# Patient Record
Sex: MALE | Race: WHITE | NOT HISPANIC OR LATINO | Employment: FULL TIME | URBAN - METROPOLITAN AREA
[De-identification: names, ages, dates, MRNs, and addresses within clinical notes are randomized per-mention and may not be internally consistent; named-entity substitution may affect disease eponyms.]

---

## 2019-08-28 ENCOUNTER — TRANSFERRED RECORDS (OUTPATIENT)
Dept: HEALTH INFORMATION MANAGEMENT | Facility: CLINIC | Age: 49
End: 2019-08-28

## 2019-09-11 RX ORDER — CITALOPRAM HYDROBROMIDE 20 MG/1
20 TABLET ORAL DAILY
COMMUNITY

## 2019-09-11 RX ORDER — LOSARTAN POTASSIUM 50 MG/1
50 TABLET ORAL DAILY
COMMUNITY
End: 2020-01-09

## 2019-09-11 RX ORDER — METOPROLOL SUCCINATE 100 MG/1
100 TABLET, EXTENDED RELEASE ORAL DAILY
COMMUNITY
End: 2022-03-04 | Stop reason: ALTCHOICE

## 2019-09-13 ENCOUNTER — OFFICE VISIT (OUTPATIENT)
Dept: OTOLARYNGOLOGY | Facility: OTHER | Age: 49
End: 2019-09-13
Attending: OTOLARYNGOLOGY
Payer: COMMERCIAL

## 2019-09-13 VITALS
WEIGHT: 236 LBS | SYSTOLIC BLOOD PRESSURE: 124 MMHG | TEMPERATURE: 97.1 F | BODY MASS INDEX: 31.28 KG/M2 | OXYGEN SATURATION: 98 % | HEART RATE: 60 BPM | HEIGHT: 73 IN | DIASTOLIC BLOOD PRESSURE: 84 MMHG

## 2019-09-13 DIAGNOSIS — R43.9 SMELL AND TASTE DISORDER: ICD-10-CM

## 2019-09-13 DIAGNOSIS — J34.3 NASAL TURBINATE HYPERTROPHY: ICD-10-CM

## 2019-09-13 DIAGNOSIS — J32.4 CHRONIC PANSINUSITIS: Primary | ICD-10-CM

## 2019-09-13 DIAGNOSIS — J33.9 NASAL POLYPOSIS: ICD-10-CM

## 2019-09-13 DIAGNOSIS — Z79.01 CHRONIC ANTICOAGULATION: ICD-10-CM

## 2019-09-13 DIAGNOSIS — Z71.6 TOBACCO ABUSE COUNSELING: ICD-10-CM

## 2019-09-13 DIAGNOSIS — J34.2 DNS (DEVIATED NASAL SEPTUM): ICD-10-CM

## 2019-09-13 PROCEDURE — 99244 OFF/OP CNSLTJ NEW/EST MOD 40: CPT | Mod: 25 | Performed by: OTOLARYNGOLOGY

## 2019-09-13 PROCEDURE — 31231 NASAL ENDOSCOPY DX: CPT | Performed by: OTOLARYNGOLOGY

## 2019-09-13 RX ORDER — PANTOPRAZOLE SODIUM 40 MG/1
40 TABLET, DELAYED RELEASE ORAL DAILY
COMMUNITY

## 2019-09-13 RX ORDER — FLUTICASONE PROPIONATE 50 MCG
2 SPRAY, SUSPENSION (ML) NASAL DAILY
Qty: 16 G | Refills: 12 | Status: SHIPPED | OUTPATIENT
Start: 2019-09-13 | End: 2019-10-24

## 2019-09-13 RX ORDER — CEFDINIR 300 MG/1
300 CAPSULE ORAL 2 TIMES DAILY
Qty: 28 CAPSULE | Refills: 0 | Status: SHIPPED | OUTPATIENT
Start: 2019-09-13 | End: 2019-10-24

## 2019-09-13 RX ORDER — BUDESONIDE 0.5 MG/2ML
INHALANT ORAL
Qty: 3 BOX | Refills: 11 | Status: SHIPPED | OUTPATIENT
Start: 2019-09-13 | End: 2019-12-10

## 2019-09-13 RX ORDER — PREDNISONE 20 MG/1
TABLET ORAL
Qty: 8 TABLET | Refills: 0 | Status: SHIPPED | OUTPATIENT
Start: 2019-09-13 | End: 2019-10-24

## 2019-09-13 ASSESSMENT — MIFFLIN-ST. JEOR: SCORE: 1989.37

## 2019-09-13 ASSESSMENT — PAIN SCALES - GENERAL: PAINLEVEL: MILD PAIN (2)

## 2019-09-13 NOTE — NURSING NOTE
"Chief Complaint   Patient presents with     Referral     E Magalieva Referral   Chronic Sinusitis       Initial /84   Pulse 60   Temp 97.1  F (36.2  C) (Tympanic)   Ht 1.854 m (6' 1\")   Wt 107 kg (236 lb)   SpO2 98%   BMI 31.14 kg/m   Estimated body mass index is 31.14 kg/m  as calculated from the following:    Height as of this encounter: 1.854 m (6' 1\").    Weight as of this encounter: 107 kg (236 lb).  Medication Reconciliation: complete  "

## 2019-09-13 NOTE — PATIENT INSTRUCTIONS
Thank you for allowing Dr. Fraser and our ENT team to participate in your care.  If your medications are too expensive, please give the nurse a call.  We can possibly change this medication.  If you have a scheduling or an appointment question please contact our Health Unit Coordinator at their direct line [phone].   ALL nursing questions or concerns can be directed to your ENT nurse at: [phone] - [place]    Use Budesonide Rinses Twice Daily  Start Flonase 2 sprays, twice daily for 1 month, then once daily  Complete Omnicef  Complete Prednisone Taper  Repeat Sinus CT  Quit Smoking  Follow up with Dr. Fraser in 1 month      Budesonide nasal saline irrigation per instructions:  -Obtain Roberto Med Sinus rinse over the counter.    -Use warm distilled water and 2 packets of the salt solution that comes with the bottle, dissolve in bottle up to the 240 mL cori.  -Add 1 vial of budesonide.  -Irrigate each side of your nose leaning over the sink, using 1/3 to 1/2 the volume of the bottle in each nostril every irrigation.  Irrigate 2 times daily.  -If additional rinses are needed/recommended, you may use the plan Roberto Med Sinus irrigation without the use of added budesonide.

## 2019-09-13 NOTE — LETTER
2019         RE: Franco Alvarez  [address]  [facility] [address]        Dear Colleague,    Thank you for referring your patient, Franco Alvarez, to the [facility]. Please see a copy of my visit note below.    Otolaryngology Consultation    Patient: Franco Alvarez  : 1970    Patient presents with:  Referral: MOE Echeverria Referral   Chronic Sinusitis      HPI:  Franco Alvarez is a 49 year old male seen today for chronic congestion and anosmia  He is congested most days for years but recent malodorous and distasteful .pnd    History of occasional sinusitis treated with abx  Hx of perennial allergic rhinitis improved sense childhood  No prior SCIT or Intradermal testing   Frequent epistaxis and dry nose  Anosmia and taste disorder x 1 month  He was placed on flonase thinking   He cannot smell fuel at work  Distasteful post nasal drainage and bad taste in mouth    Decreased sense of taste    Concussion in   Hx of migraines which are worsening over the past 3 months along with progressive sinus symptoms    Working smoke detectors    Hx of migraines with aura and nausea with usually left sided, taking relpax  No known NSAID intolerance  Her aspirin intolerance    He does have a history of DVT due to stated history of factor V Leyden deficiency and is on Xarelto  No wheezing or shortness of breath  He does have a chart history of asthma but states he has not had any adult problems with asthma    Current Outpatient Rx   Medication Sig Dispense Refill     Albuterol (VENTOLIN IN)        citalopram (CELEXA) 20 MG tablet Take 20 mg by mouth daily       losartan (COZAAR) 50 MG tablet Take 50 mg by mouth daily       metoprolol succinate ER (TOPROL-XL) 100 MG 24 hr tablet Take 100 mg by mouth daily       pantoprazole (PROTONIX) 40 MG EC tablet Take 40 mg by mouth daily       rivaroxaban ANTICOAGULANT (XARELTO) 20 MG TABS tablet Take 20 mg by mouth daily (with dinner)         Allergies:  "Seasonal allergies     Past Medical History:   Diagnosis Date     Anxiety disorder      Asthma      Migraine headache        History reviewed. No pertinent surgical history.    ENT family history reviewed    Social History     Tobacco Use     Smoking status: Light Tobacco Smoker     Smokeless tobacco: Never Used     Tobacco comment: 3-4 cigs daily   Substance Use Topics     Alcohol use: Yes     Drug use: None       Review of Systems  ROS: 10 point ROS neg other than the symptoms noted above in the HPI and heartburn, swollen feet, occasional sneezing hearing loss ear drainage ear congestion night sweats tingling numbness.      Physical Exam  /84   Pulse 60   Temp 97.1  F (36.2  C) (Tympanic)   Ht 1.854 m (6' 1\")   Wt 107 kg (236 lb)   SpO2 98%   BMI 31.14 kg/m     General - The patient is well nourished and well developed, and appears to have good nutritional status.  Alert and oriented to person and place, answers questions and cooperates with examination appropriately.   Head and Face - Normocephalic and atraumatic, with no gross asymmetry noted.  The facial nerve is intact, with strong symmetric movements.  Voice and Breathing - The patient was breathing comfortably without the use of accessory muscles. There was no wheezing, stridor, or stertor.  The patients voice was clear and strong, and had appropriate pitch and quality.  No leo peripheral digital clubbing or cyanosis   Ears -The external auditory canals are patent, the tympanic membranes are intact without effusion, retraction or mass.  Bony landmarks are intact.  Eyes - Extraocular movements intact, and the pupils were reactive to light.  Sclera were not icteric or injected, conjunctiva were pink and moist.  Mouth - Examination of the oral cavity showed pink, healthy oral mucosa. No lesions or ulcerations noted.  The tongue was mobile and midline, and the dentition were in good condition.    Throat - The walls of the oropharynx were smooth, " pink, moist, symmetric, and had no lesions or ulcerations.  The tonsillar pillars and soft palate were symmetric.  The uvula was midline on elevation.    Neck - No palpable enlarged fixed cervical lymph nodes.  No neck cysts or unusual tenderness to palpation.   No palpable fixed thyroid nodules or concerning goiter.  The trachea is grossly midline.   Nose - External contour is symmetric, no gross deflection or scars.  Nasal mucosa is pink and moist with no abnormal mucus.  The septum and turbinates were evaluated: DNS left with contact 80% obstruction, severe ITH.  No polyps, masses, or purulence noted on examination.    To evaluate the nose and sinuses in the post operative state, I performed rigid nasal endoscopy. The LPN had previously sprayed both nares with lidocaine and neosynephrine.    I began with the LEFT side using a 0 degree rigid nasal endoscope, and then similarly examined the RIGHT side    Findings:  Inferior turbinates:  Boggy, enlarged  Middle turbinate and middle meatus:  Purulence throughout with polyps involving the middle meatus, purulence was scant and suction debrided, Edematous mucosa throughotu  Too edematous to viz NP    Imaging  CT sinuses has not been noted a pack a time to look at this for times daily CT 8/28/2019 mucosal thickening was noted of the frontal ethmoid sphenoid and maxillary sinuses and air-fluid levels noted in the left maxillary sinuse the ostium middle complexes are obstructed septal deviation to the left turbinate hypertrophy is noted    Impression and Plan- Franco Alvarez is a 49 year old male with:    ICD-10-CM    1. Chronic pansinusitis J32.4 budesonide (PULMICORT) 0.5 MG/2ML neb solution     cefdinir (OMNICEF) 300 MG capsule     fluticasone (FLONASE) 50 MCG/ACT nasal spray     predniSONE (DELTASONE) 20 MG tablet     CT Sinus w/o Contrast   2. Nasal polyposis J33.9 CT Sinus w/o Contrast   3. DNS (deviated nasal septum) J34.2    4. Nasal turbinate hypertrophy J34.3     5. 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 Chronic anticoagulation Z79.01    7. Tobacco abuse counseling Z71.6        I would like to see how is doing after treatments and repeat the CAT scan in 2 to 4 weeks if no improvement consider  Endoscopic Sinus Surgery (ESS) with polypectomy,  septoplasty, turbinate reduction    This was all discussed today    Use Budesonide Rinses Twice Daily  Start Flonase 2 sprays, twice daily for 1 month, then once daily  Complete Omnicef  Complete Prednisone Taper  Repeat Sinus CT  Quit Smoking  Follow up with Dr. Fraser in 1 month      Budesonide nasal saline irrigation per instructions:  -Obtain Roberto Med Sinus rinse over the counter.    -Use warm distilled water and 2 packets of the salt solution that comes with the bottle, dissolve in bottle up to the 240 mL cori.  -Add 1 vial of budesonide.  -Irrigate each side of your nose leaning over the sink, using 1/3 to 1/2 the volume of the bottle in each nostril every irrigation.  Irrigate 2 times daily.  -If additional rinses are needed/recommended, you may use the plan Roberto Med Sinus irrigation without the use of added budesonide.       Risks of oral steroid use were discussed and include psychiatric/mood changes, insomnia, stomach ulcers and potential GI bleeding, blood sugar elevation/worsening diabetes, hip/bone necrosis or bone demineralization.    He did have a distant stomach ulcer and he is on Xarelto so I told him he is at high risk of complications from steroid use.  However he is very bothered by his taste and smell disorder    Tobacco cessation was strongly encouraged.  The associated risk of head and neck cancer was discussed.  Every year of cessation offers health benefits. This was discussed with the patient today and they voiced understanding.  Quit tools and a nicotine patch were offered.      The most common cause of loss of sense of smell (anosmia) is a viral infection, followed by trauma (particularly in younger  patients)     Antihyperlipidemics (lipitor or similar) can cause persistent taste and smell dysfunction even after years of use.  I recommend stopping antihyperlipidemics per primary care, unless there are undue risks of stopping medical treatment.      I will order a CT sinuses if this has not already been done to ensure there are no polyps within your sinuses.  No nasal polyps were seen on exam today, nor sign of a sinus infection.  It is OK to postpone CT sinuses as I see no obvious concern on endoscopy today.    The remainder of today's visit was spent discussing anosmia.  I stressed that according to my experience and the clinical research, the underlying cause of anosmia is actually rarely found.  I have explained the indications for a brain MRI, and the cost versus benefit of MRI and the fact that MRI is a very low yield test in finding a central problem causing anosmia or taste disorder.    Zinc deficiency and thyroid disease my lead to taste and smell disorder.  Thyroid labs and zinc levels were drawn.      Dental follow up may be recommended to rule out dental disease, which was not obviously present today. Any history of trauma to the oral cavity or head involving metal/gunshot may cause lead leaching.  A lead level may be checked.    Next we discussed important precautions to take when one has an altered sense of smell.  For example, I recommended use of natural gas and carbon monoxide detection in the home, and being very careful with expiration dates on foods.    Finally, alternative techniques to make food more interesting were also discussed.  These included spiciness, visual appeal, and more interesting textures.    There were no local mouth or nose abnormalities found on today's exam.      Follow up as needed for any additional ENT concerns.                    Marya Fraser D.O.  Otolaryngology/Head and Neck Surgery  Allergy      Again, thank you for allowing me to participate in the care  of your patient.        Sincerely,        Marya Fraser MD

## 2019-09-13 NOTE — PROGRESS NOTES
Otolaryngology Consultation    Patient: Franco Alvarez  : 1970    Patient presents with:  Referral: MOE Echeverria Referral   Chronic Sinusitis      HPI:  Franco Alvarez is a 49 year old male seen today for chronic congestion and anosmia  He is congested most days for years but recent malodorous and distasteful .pnd    History of occasional sinusitis treated with abx  Hx of perennial allergic rhinitis improved sense childhood  No prior SCIT or Intradermal testing   Frequent epistaxis and dry nose  Anosmia and taste disorder x 1 month  He was placed on flonase thinking   He cannot smell fuel at work  Distasteful post nasal drainage and bad taste in mouth    Decreased sense of taste    Concussion in   Hx of migraines which are worsening over the past 3 months along with progressive sinus symptoms    Working smoke detectors    Hx of migraines with aura and nausea with usually left sided, taking relpax  No known NSAID intolerance  Her aspirin intolerance    He does have a history of DVT due to stated history of factor V Leyden deficiency and is on Xarelto  No wheezing or shortness of breath  He does have a chart history of asthma but states he has not had any adult problems with asthma    Current Outpatient Rx   Medication Sig Dispense Refill     Albuterol (VENTOLIN IN)        citalopram (CELEXA) 20 MG tablet Take 20 mg by mouth daily       losartan (COZAAR) 50 MG tablet Take 50 mg by mouth daily       metoprolol succinate ER (TOPROL-XL) 100 MG 24 hr tablet Take 100 mg by mouth daily       pantoprazole (PROTONIX) 40 MG EC tablet Take 40 mg by mouth daily       rivaroxaban ANTICOAGULANT (XARELTO) 20 MG TABS tablet Take 20 mg by mouth daily (with dinner)         Allergies: Seasonal allergies     Past Medical History:   Diagnosis Date     Anxiety disorder      Asthma      Migraine headache        History reviewed. No pertinent surgical history.    ENT family history reviewed    Social History     Tobacco Use      "Smoking status: Light Tobacco Smoker     Smokeless tobacco: Never Used     Tobacco comment: 3-4 cigs daily   Substance Use Topics     Alcohol use: Yes     Drug use: None       Review of Systems  ROS: 10 point ROS neg other than the symptoms noted above in the HPI and heartburn, swollen feet, occasional sneezing hearing loss ear drainage ear congestion night sweats tingling numbness.      Physical Exam  /84   Pulse 60   Temp 97.1  F (36.2  C) (Tympanic)   Ht 1.854 m (6' 1\")   Wt 107 kg (236 lb)   SpO2 98%   BMI 31.14 kg/m    General - The patient is well nourished and well developed, and appears to have good nutritional status.  Alert and oriented to person and place, answers questions and cooperates with examination appropriately.   Head and Face - Normocephalic and atraumatic, with no gross asymmetry noted.  The facial nerve is intact, with strong symmetric movements.  Voice and Breathing - The patient was breathing comfortably without the use of accessory muscles. There was no wheezing, stridor, or stertor.  The patients voice was clear and strong, and had appropriate pitch and quality.  No leo peripheral digital clubbing or cyanosis   Ears -The external auditory canals are patent, the tympanic membranes are intact without effusion, retraction or mass.  Bony landmarks are intact.  Eyes - Extraocular movements intact, and the pupils were reactive to light.  Sclera were not icteric or injected, conjunctiva were pink and moist.  Mouth - Examination of the oral cavity showed pink, healthy oral mucosa. No lesions or ulcerations noted.  The tongue was mobile and midline, and the dentition were in good condition.    Throat - The walls of the oropharynx were smooth, pink, moist, symmetric, and had no lesions or ulcerations.  The tonsillar pillars and soft palate were symmetric.  The uvula was midline on elevation.    Neck - No palpable enlarged fixed cervical lymph nodes.  No neck cysts or unusual tenderness " to palpation.   No palpable fixed thyroid nodules or concerning goiter.  The trachea is grossly midline.   Nose - External contour is symmetric, no gross deflection or scars.  Nasal mucosa is pink and moist with no abnormal mucus.  The septum and turbinates were evaluated: DNS left with contact 80% obstruction, severe ITH.  No polyps, masses, or purulence noted on examination.    To evaluate the nose and sinuses in the post operative state, I performed rigid nasal endoscopy. The LPN had previously sprayed both nares with lidocaine and neosynephrine.    I began with the LEFT side using a 0 degree rigid nasal endoscope, and then similarly examined the RIGHT side    Findings:  Inferior turbinates:  Boggy, enlarged  Middle turbinate and middle meatus:  Purulence throughout with polyps involving the middle meatus, purulence was scant and suction debrided, Edematous mucosa throughotu  Too edematous to viz NP    Imaging  CT sinuses has not been noted a pack a time to look at this for times daily CT 8/28/2019 mucosal thickening was noted of the frontal ethmoid sphenoid and maxillary sinuses and air-fluid levels noted in the left maxillary sinuse the ostium middle complexes are obstructed septal deviation to the left turbinate hypertrophy is noted    Impression and Plan- Franco Alvarez is a 49 year old male with:    ICD-10-CM    1. Chronic pansinusitis J32.4 budesonide (PULMICORT) 0.5 MG/2ML neb solution     cefdinir (OMNICEF) 300 MG capsule     fluticasone (FLONASE) 50 MCG/ACT nasal spray     predniSONE (DELTASONE) 20 MG tablet     CT Sinus w/o Contrast   2. Nasal polyposis J33.9 CT Sinus w/o Contrast   3. DNS (deviated nasal septum) J34.2    4. Nasal turbinate hypertrophy J34.3    5. 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 Chronic anticoagulation Z79.01    7. Tobacco abuse counseling Z71.6        I would like to see how is doing after treatments and repeat the CAT scan in 2 to 4 weeks if no improvement consider  Endoscopic  Sinus Surgery (ESS) with polypectomy,  septoplasty, turbinate reduction    This was all discussed today    Use Budesonide Rinses Twice Daily  Start Flonase 2 sprays, twice daily for 1 month, then once daily  Complete Omnicef  Complete Prednisone Taper  Repeat Sinus CT  Quit Smoking  Follow up with Dr. Fraser in 1 month      Budesonide nasal saline irrigation per instructions:  -Obtain Roberto Med Sinus rinse over the counter.    -Use warm distilled water and 2 packets of the salt solution that comes with the bottle, dissolve in bottle up to the 240 mL cori.  -Add 1 vial of budesonide.  -Irrigate each side of your nose leaning over the sink, using 1/3 to 1/2 the volume of the bottle in each nostril every irrigation.  Irrigate 2 times daily.  -If additional rinses are needed/recommended, you may use the plan Roberto Med Sinus irrigation without the use of added budesonide.       Risks of oral steroid use were discussed and include psychiatric/mood changes, insomnia, stomach ulcers and potential GI bleeding, blood sugar elevation/worsening diabetes, hip/bone necrosis or bone demineralization.    He did have a distant stomach ulcer and he is on Xarelto so I told him he is at high risk of complications from steroid use.  However he is very bothered by his taste and smell disorder    Tobacco cessation was strongly encouraged.  The associated risk of head and neck cancer was discussed.  Every year of cessation offers health benefits. This was discussed with the patient today and they voiced understanding.  Quit tools and a nicotine patch were offered.      The most common cause of loss of sense of smell (anosmia) is a viral infection, followed by trauma (particularly in younger patients)     Antihyperlipidemics (lipitor or similar) can cause persistent taste and smell dysfunction even after years of use.  I recommend stopping antihyperlipidemics per primary care, unless there are undue risks of stopping medical treatment.       I will order a CT sinuses if this has not already been done to ensure there are no polyps within your sinuses.  No nasal polyps were seen on exam today, nor sign of a sinus infection.  It is OK to postpone CT sinuses as I see no obvious concern on endoscopy today.    The remainder of today's visit was spent discussing anosmia.  I stressed that according to my experience and the clinical research, the underlying cause of anosmia is actually rarely found.  I have explained the indications for a brain MRI, and the cost versus benefit of MRI and the fact that MRI is a very low yield test in finding a central problem causing anosmia or taste disorder.    Zinc deficiency and thyroid disease my lead to taste and smell disorder.  Thyroid labs and zinc levels were drawn.      Dental follow up may be recommended to rule out dental disease, which was not obviously present today. Any history of trauma to the oral cavity or head involving metal/gunshot may cause lead leaching.  A lead level may be checked.    Next we discussed important precautions to take when one has an altered sense of smell.  For example, I recommended use of natural gas and carbon monoxide detection in the home, and being very careful with expiration dates on foods.    Finally, alternative techniques to make food more interesting were also discussed.  These included spiciness, visual appeal, and more interesting textures.    There were no local mouth or nose abnormalities found on today's exam.      Follow up as needed for any additional ENT concerns.                    Marya Fraser D.O.  Otolaryngology/Head and Neck Surgery  Allergy

## 2019-09-23 ENCOUNTER — HOSPITAL ENCOUNTER (OUTPATIENT)
Dept: CT IMAGING | Facility: HOSPITAL | Age: 49
Discharge: HOME | End: 2019-09-23
Attending: OTOLARYNGOLOGY | Admitting: OTOLARYNGOLOGY
Payer: COMMERCIAL

## 2019-09-23 DIAGNOSIS — J32.4 CHRONIC PANSINUSITIS: ICD-10-CM

## 2019-09-23 DIAGNOSIS — J33.9 NASAL POLYPOSIS: ICD-10-CM

## 2019-09-23 PROCEDURE — 70486 CT MAXILLOFACIAL W/O DYE: CPT | Mod: TC

## 2019-10-24 ENCOUNTER — OFFICE VISIT (OUTPATIENT)
Dept: OTOLARYNGOLOGY | Facility: OTHER | Age: 49
End: 2019-10-24
Attending: OTOLARYNGOLOGY
Payer: COMMERCIAL

## 2019-10-24 VITALS
WEIGHT: 236 LBS | DIASTOLIC BLOOD PRESSURE: 74 MMHG | HEART RATE: 67 BPM | SYSTOLIC BLOOD PRESSURE: 128 MMHG | OXYGEN SATURATION: 98 % | HEIGHT: 73 IN | BODY MASS INDEX: 31.28 KG/M2 | TEMPERATURE: 98.1 F

## 2019-10-24 DIAGNOSIS — Z71.6 TOBACCO ABUSE COUNSELING: ICD-10-CM

## 2019-10-24 DIAGNOSIS — R43.9 SMELL AND TASTE DISORDER: ICD-10-CM

## 2019-10-24 DIAGNOSIS — J34.3 NASAL TURBINATE HYPERTROPHY: ICD-10-CM

## 2019-10-24 DIAGNOSIS — J34.2 DNS (DEVIATED NASAL SEPTUM): ICD-10-CM

## 2019-10-24 DIAGNOSIS — J32.4 CHRONIC PANSINUSITIS: Primary | ICD-10-CM

## 2019-10-24 DIAGNOSIS — J33.9 NASAL POLYP: ICD-10-CM

## 2019-10-24 PROCEDURE — 99215 OFFICE O/P EST HI 40 MIN: CPT | Mod: 25 | Performed by: OTOLARYNGOLOGY

## 2019-10-24 PROCEDURE — 31231 NASAL ENDOSCOPY DX: CPT | Performed by: OTOLARYNGOLOGY

## 2019-10-24 RX ORDER — PREDNISONE 20 MG/1
TABLET ORAL
Qty: 5 TABLET | Refills: 1 | Status: SHIPPED | OUTPATIENT
Start: 2019-10-24 | End: 2020-01-09

## 2019-10-24 ASSESSMENT — PAIN SCALES - GENERAL: PAINLEVEL: NO PAIN (0)

## 2019-10-24 ASSESSMENT — MIFFLIN-ST. JEOR: SCORE: 1989.37

## 2019-10-24 NOTE — LETTER
"    10/24/2019         RE: Franco Alvarez  [address]  [facility] [address]        Dear Colleague,    Thank you for referring your patient, Franco Alvarez, to the [facility]. Please see a copy of my visit note below.    Otolaryngology Progress Note          Franco Alvarez is a 49 year old male    Back for evaluation of sinuses.  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 factor V Leyden deficiency and is on Xarelto started on budesonide irrigations and Omnicef and continue Flonase use he is here for follow-up I did note a septal deviation on exam and severe mucosal edema throughout I could not visualize his nasopharynx he is a smoker and a encouraged tobacco cessation if no improvement in symptoms is considering endoscopic sinus surgery with polypectomy septoplasty and turbinate reduction          Physical Exam  /74 (Cuff Size: Adult Large)   Pulse 67   Temp 98.1  F (36.7  C) (Tympanic)   Ht 1.854 m (6' 1\")   Wt 107 kg (236 lb)   SpO2 98%   BMI 31.14 kg/m     General - The patient is well nourished and well developed, and appears to have good nutritional status.  Alert and oriented to person and place, interactive.  Head and Face - Normocephalic and atraumatic, with no gross asymmetry noted of the contour of the facial features.  The facial nerve is intact, with strong symmetric movements.  Eyes - Extraocular movements intact.   Nose - Nasal mucosa is pink and moist with no abnormal mucus.  The septum was deviated left 80% " obstruction, spur  Mouth - Examination of the oral cavity shows pink, healthy, moist mucosa.  No lesions or ulceration noted.  The dentition are in good repair.  The tongue is mobile and midline.  Throat - The walls of the oropharynx were smooth, pink, moist, symmetric, and had no lesions or ulcerations.  The tonsillar pillars and soft palate were symmetric.  The uvula was midline on elevation.      To evaluate the nose and sinuses, I performed rigid nasal endoscopy. The LPN had previously sprayed both nares with lidocaine and neosynephrine.    I began with the LEFT side using a 0 degree rigid nasal endoscope, and then similarly examined the RIGHT side    Findings:  Inferior turbinates:  enlarged  Middle turbinate and middle meatus:  No purulence, MM polyposis,   Mucosa is less edematous throughout  Nasopharynx grossly clear R  The patient tolerated the procedure well      Imaging  CT scan dated 9/23/2019 revealed septal deviation to the left with mucoperiosteal thickening throughout      There is mucoperiosteal thickening and polypoid opacification of the ethmoid sinuses as well as the frontal and maxillary sinuses there is a septal deviation to the left knee compressive thickening of the sphenoid skull base is intact optic nerve is intact nasopharynx is grossly clear, keros 2  rosaura 19/24  snot 56         Impression/Plan  Franco Alvarez is a 49 year old male    ICD-10-CM    1. Chronic pansinusitis J32.4 predniSONE (DELTASONE) 20 MG tablet   2. Nasal polyp J33.9    3. DNS (deviated nasal septum) J34.2    4. Nasal turbinate hypertrophy J34.3    5. Tobacco abuse counseling Z71.6    6. Smell and taste disorder R43.9      No guarantees were given that his sense of smell and taste would improve that the malodorous taste and smell would improve.  Viral and trauma are the primary causes of anosmia and taste smell disorder and he has had a history of concussion as well as repeated sinusitis.  I do feel there is a good  chance he may have improvement based on where his disease is located however and this was discussed with Franco.    Tobacco cessation was strongly encouraged.  The associated risk of head and neck cancer was discussed.  Every year of cessation offers health benefits. This was discussed with the patient today and they voiced understanding.  Quit tools and a nicotine patch were offered.        The risks and complications of bilateral endoscopic sinus surgery septoplasty turbinate reduction were openly discussed.  The potential risks include bleeding, general anesthesia, infection, scar formation, need for additional surgery, septal perforation, and rarely injury to the eye with the possibility of blindness,  injury to the brain, meningitis or other intracranial complications.  Nonsurgical options were discussed including prolonged antibioitics and/or oral and topical steroids.   Sinus anatomy and sinus disease were reviewed.  CT sinus was reviewed with the patient.  All questions were answered.   Plan total with propel  Will need nasopore use due to Xarelto    Would like to hold Xarelto 3-5 days prior to surgery and 3-5 days post op  Total exam time was over 40 minutes with over 25 minutes of this time spent in direct patient education, counseling and coordination of care.  We discussed the importance of high flow nasal saline use to prevent nasal secretion stasis, the correct use of nasal steroids, and nasal and sinus anatomy were reviewed.        Marya Fraser D.O.  Otolaryngology/Head and Neck Surgery  Allergy        Again, thank you for allowing me to participate in the care of your patient.        Sincerely,        Marya Fraser MD

## 2019-10-24 NOTE — LETTER
Please excuse Franco from work for the morning of 10/24/19. He was at an appointment at our facility          Thank you,       Dr. Marya Fraser

## 2019-10-24 NOTE — NURSING NOTE
"Chief Complaint   Patient presents with     RECHECK     Follow Up Chronic Pansinusitis, Nasal Polyposis, Deviated Nasal Septum, Nasal Turbinate Hypertrophy       Initial /74 (Cuff Size: Adult Large)   Pulse 67   Temp 98.1  F (36.7  C) (Tympanic)   Ht 1.854 m (6' 1\")   Wt 107 kg (236 lb)   SpO2 98%   BMI 31.14 kg/m   Estimated body mass index is 31.14 kg/m  as calculated from the following:    Height as of this encounter: 1.854 m (6' 1\").    Weight as of this encounter: 107 kg (236 lb).  Medication Reconciliation: complete  Apple Santoro LPN    "

## 2019-10-24 NOTE — PROGRESS NOTES
"Otolaryngology Progress Note          Franco Alvarez is a 49 year old male    Back for evaluation of sinuses.  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 factor V Leyden deficiency and is on Xarelto started on budesonide irrigations and Omnicef and continue Flonase use he is here for follow-up I did note a septal deviation on exam and severe mucosal edema throughout I could not visualize his nasopharynx he is a smoker and a encouraged tobacco cessation if no improvement in symptoms is considering endoscopic sinus surgery with polypectomy septoplasty and turbinate reduction          Physical Exam  /74 (Cuff Size: Adult Large)   Pulse 67   Temp 98.1  F (36.7  C) (Tympanic)   Ht 1.854 m (6' 1\")   Wt 107 kg (236 lb)   SpO2 98%   BMI 31.14 kg/m    General - The patient is well nourished and well developed, and appears to have good nutritional status.  Alert and oriented to person and place, interactive.  Head and Face - Normocephalic and atraumatic, with no gross asymmetry noted of the contour of the facial features.  The facial nerve is intact, with strong symmetric movements.  Eyes - Extraocular movements intact.   Nose - Nasal mucosa is pink and moist with no abnormal mucus.  The septum was deviated left 80% obstruction, spur  Mouth - Examination of the oral cavity shows pink, healthy, moist mucosa.  No lesions or ulceration noted.  The dentition are in good repair.  The tongue is mobile and midline.  Throat - The walls of the oropharynx were smooth, " pink, moist, symmetric, and had no lesions or ulcerations.  The tonsillar pillars and soft palate were symmetric.  The uvula was midline on elevation.      To evaluate the nose and sinuses, I performed rigid nasal endoscopy. The LPN had previously sprayed both nares with lidocaine and neosynephrine.    I began with the LEFT side using a 0 degree rigid nasal endoscope, and then similarly examined the RIGHT side    Findings:  Inferior turbinates:  enlarged  Middle turbinate and middle meatus:  No purulence, MM polyposis,   Mucosa is less edematous throughout  Nasopharynx grossly clear R  The patient tolerated the procedure well      Imaging  CT scan dated 9/23/2019 revealed septal deviation to the left with mucoperiosteal thickening throughout      There is mucoperiosteal thickening and polypoid opacification of the ethmoid sinuses as well as the frontal and maxillary sinuses there is a septal deviation to the left knee compressive thickening of the sphenoid skull base is intact optic nerve is intact nasopharynx is grossly clear, keros 2  rosaura 19/24  snot 56         Impression/Plan  Franco Alvarez is a 49 year old male    ICD-10-CM    1. Chronic pansinusitis J32.4 predniSONE (DELTASONE) 20 MG tablet   2. Nasal polyp J33.9    3. DNS (deviated nasal septum) J34.2    4. Nasal turbinate hypertrophy J34.3    5. Tobacco abuse counseling Z71.6    6. Smell and taste disorder R43.9      No guarantees were given that his sense of smell and taste would improve that the malodorous taste and smell would improve.  Viral and trauma are the primary causes of anosmia and taste smell disorder and he has had a history of concussion as well as repeated sinusitis.  I do feel there is a good chance he may have improvement based on where his disease is located however and this was discussed with Franco.    Tobacco cessation was strongly encouraged.  The associated risk of head and neck cancer was discussed.  Every year of cessation offers  health benefits. This was discussed with the patient today and they voiced understanding.  Quit tools and a nicotine patch were offered.        The risks and complications of bilateral endoscopic sinus surgery septoplasty turbinate reduction were openly discussed.  The potential risks include bleeding, general anesthesia, infection, scar formation, need for additional surgery, septal perforation, and rarely injury to the eye with the possibility of blindness,  injury to the brain, meningitis or other intracranial complications.  Nonsurgical options were discussed including prolonged antibioitics and/or oral and topical steroids.   Sinus anatomy and sinus disease were reviewed.  CT sinus was reviewed with the patient.  All questions were answered.   Plan total with propel  Will need nasopore use due to Xarelto    Would like to hold Xarelto 3-5 days prior to surgery and 3-5 days post op  Total exam time was over 40 minutes with over 25 minutes of this time spent in direct patient education, counseling and coordination of care.  We discussed the importance of high flow nasal saline use to prevent nasal secretion stasis, the correct use of nasal steroids, and nasal and sinus anatomy were reviewed.        Marya Fraser D.O.  Otolaryngology/Head and Neck Surgery  Allergy

## 2019-10-24 NOTE — PATIENT INSTRUCTIONS
Thank you for allowing Dr. Frasre and our ENT team to participate in your care.  If your medications are too expensive, please give the nurse a call.  We can possibly change this medication.  If you have a scheduling or an appointment question please contact our Health Unit Coordinator at their direct line [phone].   ALL nursing questions or concerns can be directed to your ENT nurse at: [phone] Yaneth Chiu    Continue Budesonide Rinses   Continue Flonase  Follow up in surgery    Budesonide nasal saline irrigation per instructions:  -Obtain Roberto Med Sinus rinse over the counter.    -Use warm distilled water and 2 packets of the salt solution that comes with the bottle, dissolve in bottle up to the 240 mL cori.  -Add 1 vial of budesonide.  -Irrigate each side of your nose leaning over the sink, using 1/3 to 1/2 the volume of the bottle in each nostril every irrigation.  Irrigate 2 times daily.  -If additional rinses are needed/recommended, you may use the plan Roberto Med Sinus irrigation without the use of added budesonide.     Instructions for Sinus Surgery    Recovery - Everyone recovers differently from a general anesthetic.  Symptoms such as fatigue, nausea, light-headedness, and sometimes a low grade fever (up to 100 degrees) are not unusual.  As your body removes the anesthetic drugs from circulation, these symptoms will resolve.  Your nose will be sore after surgery, and you may even have symptoms similar to a sinus infection with headache, congestion, and pressure.  These will resolve with healing.  For several days you may experience bloody drainage from the nose, please use the drip pad as necessary for this.  If there is persistent bleeding, please call the office during business hours or the on call ENT physician after hours.  There are no diet restrictions after sinus surgery, and you can resume your home medications.      Please do not blow your nose until 1 week after surgery.  At 1 week you  may gently blow your nose, unless otherwise indicated by Dr. Fraser.     Limit your activity to no strenuous activities until I see you for the first follow-up visit in approximately 2 weeks.      Medications - You were sent home with narcotic pain medication.  If you can tolerate the discomfort during your recovery by using just plain Tylenol or ibuprofen (advil), please do so.  However, do not hesitate to use the stronger pain medication if needed.  If you were sent home with an antibiotic, it is primarily used to help the healing process.  If it causes loose bowel movements or other signs of intolerance, it is appropriate to discontinue it.  By far the most important measure you can take to speed recovery, and maximize the chances of long term success of sinus surgery is using the sinus rinses at least three time per day for the first month after surgery.       Start Chrissy Med saline irrigation tomorrow and use at least 5 times daily.     I recommend 2 chrissy med bottles, one to add the budesonide to and irrigate with the budesonide rinse twice a day for 2 months.    In the other chrissy med bottle use only the saline solution, and irrigate with this at least 3 additional times daily.    Perform gentle irrigation for the first week.  Starting 1 week after surgery, you should increase the volume of chrissy med saline irrigation to each nostril, continuing to use the rinses in an alternating fashion at least 5 times daily.  You cannot use too much of the chrissy med saline, but please limit budesonide rinses to twice daily.    At 2 weeks after surgery, you may also restart nasal steroids (flonase, nasonex, etc).        Complications - Problems related to sinus surgery almost always are detected during the operation, and special instruction will be given in that situation.  However, unexpected things can happen, and are all related to the structures around the sinus cavities.  Symptoms that should alert you to a possible  problem include: severe eye pain or eye swelling, persistent heavy bleeding from the nose, and high fevers with headache and neck pain.  Any of these symptoms should be called into my office or to the on call ENT if after hours.  The most common non-emergency complication of sinus surgery is the formation of scar tissue which can re-block the sinuses.  This is addressed below.    Follow-up -  As you have noted, there are quite a few follow-up visits after sinus surgery.  This is done to aggressively manage the most common complication of this technique, which is scar tissue blocking the sinuses.  These visits will require the examination of your nose and possibly removal of crusts of dry mucous and blood, with possible removal of early scar tissue.  Please prepare for these visits by using your sinus rinses.    If there are any questions or issues with the above, or if there are other issues that concern you, always feel free to call the clinic and I am happy to speak with you as soon as I can.    Marya Fraser D.O.  Otolaryngology/Head and Neck Surgery  Allergy    [phone] office            HOW TO PREPARE-      You need to have a scheduled Pre-Op with your primary care physician within 30 days of your scheduled surgery.        You need a friend or family member available to drive you home AND stay with you for 24 hours after you leave the hospital. You will not be allowed to drive yourself. IF you need to take a taxi or the bus you MUST have a responsible person to ride with you. YOUR PROCEDURE WILL BE CANCELLED IF YOU DO NOT HAVE A RESPONSIBLE ADULT TO DRIVE YOU HOME.       You CANNOT have anything to eat or drink after midnight the night before your surgery, including water and coffee. Your stomach needs to be completely empty. Do NOT chew gum, suck on hard candy, or smoke. You can brush your teeth the morning of surgery.       The Surgery Education Nurses will call you  1-2 weeks prior to your  surgery date at  [phone] or toll free [phone]. Please have your medication and allergy lists ready.      Stop your aspirin or other NSAIDs(Ibuprofen, Motrin, Aleve, Celebrex, Naproxen, etc...) 7 days before your surgery.      Hospital admitting will call you the day before your surgery with your exact arrival time.       Please call your primary care physician if you should become ill within 24 hours of scheduled surgery. (ex.vomiting, diarrhea, fever)          You will need to wash the night before AND the morning of you procedure with a liquid antibacterial soap, like Dial.

## 2019-11-20 ENCOUNTER — TRANSFERRED RECORDS (OUTPATIENT)
Dept: HEALTH INFORMATION MANAGEMENT | Facility: HOSPITAL | Age: 49
End: 2019-11-20

## 2019-11-20 LAB
CREAT SERPL-MCNC: 0.9 MG/DL (ref 0.7–1.4)
GLUCOSE SERPL-MCNC: 102 MG/DL (ref 64–112)
INR PPP: 18.2 (ref 7.26–12.96)
POTASSIUM SERPL-SCNC: 4.1 MMOL/L (ref 3.5–5.3)

## 2019-12-03 RX ORDER — FLUTICASONE PROPIONATE 50 MCG
2 SPRAY, SUSPENSION (ML) NASAL DAILY
COMMUNITY
End: 2020-06-03

## 2019-12-05 ENCOUNTER — ANESTHESIA EVENT (OUTPATIENT)
Dept: SURGERY | Facility: HOSPITAL | Age: 49
End: 2019-12-05
Payer: COMMERCIAL

## 2019-12-05 PROBLEM — G47.33 OBSTRUCTIVE SLEEP APNEA (ADULT) (PEDIATRIC): Status: ACTIVE | Noted: 2019-07-12

## 2019-12-05 ASSESSMENT — LIFESTYLE VARIABLES: TOBACCO_USE: 1

## 2019-12-05 NOTE — ANESTHESIA PREPROCEDURE EVALUATION
Anesthesia Pre-Procedure Evaluation    Patient: Franco Alvarez   MRN: [id number] : 1970          Preoperative Diagnosis: Chronic pansinusitis [J32.4]  Nose polyp [J33.9]  Deviated nasal septum [J34.2]  Hypertrophy of nasal turbinates [J34.3]  Tobacco abuse counseling [Z71.6]  Disturbances of sensation of smell and taste [R43.9]    Procedure(s):  BILATERAL ENDOSCOPIC SINUS SURGERY  SEPTOPLASTY, TURBINATE REDUCTION    Past Medical History:   Diagnosis Date     Anxiety disorder      Asthma      Factor V Leiden (H)      Migraine headache      History reviewed. No pertinent surgical history.    Anesthesia Evaluation     . Pt has not had prior anesthetic            ROS/MED HX    ENT/Pulmonary:     (+)sleep apnea, tobacco use, Current use Intermittent asthma Treatment: Inhaler prn,  doesn't use CPAP , . Other pulmonary disease chronic sinusitis .    Neurologic:     (+)migraines,     Cardiovascular:     (+) hypertension----. : . . . :. . Previous cardiac testing date:results:date: results:ECG reviewed date:19 results:Sr, incomplete RBBB date: results:          METS/Exercise Tolerance:     Hematologic:     (+) History of blood clots pt is anticoagulated, Other Hematologic Disorder-Factor V Leiden       Musculoskeletal:  - neg musculoskeletal ROS       GI/Hepatic:     (+) GERD Asymptomatic on medication,       Renal/Genitourinary:  - ROS Renal section negative       Endo:     (+) Obesity, .      Psychiatric:     (+) psychiatric history anxiety      Infectious Disease:  - neg infectious disease ROS       Malignancy:      - no malignancy   Other:    - neg other ROS                      Physical Exam  Normal systems: cardiovascular and pulmonary    Airway   Mallampati: II  TM distance: >3 FB  Neck ROM: full    Dental     Cardiovascular   Rhythm and rate: regular and normal      Pulmonary    breath sounds clear to auscultation            No results found for: WBC, HGB, HCT, PLT, CRP, SED, NA, POTASSIUM, CHLORIDE,  "CO2, BUN, CR, GLC, LUPE, PHOS, MAG, ALBUMIN, PROTTOTAL, ALT, AST, GGT, ALKPHOS, BILITOTAL, BILIDIRECT, LIPASE, AMYLASE, JAMEL, PTT, INR, FIBR, TSH, T4, T3, HCG, HCGS, CKTOTAL, CKMB, TROPN    Preop Vitals  BP Readings from Last 3 Encounters:   10/24/19 128/74   09/13/19 124/84    Pulse Readings from Last 3 Encounters:   10/24/19 67   09/13/19 60      Resp Readings from Last 3 Encounters:   No data found for Resp    SpO2 Readings from Last 3 Encounters:   10/24/19 98%   09/13/19 98%      Temp Readings from Last 1 Encounters:   10/24/19 98.1  F (36.7  C) (Tympanic)    Ht Readings from Last 1 Encounters:   10/24/19 1.854 m (6' 1\")      Wt Readings from Last 1 Encounters:   10/24/19 107 kg (236 lb)    Estimated body mass index is 31.14 kg/m  as calculated from the following:    Height as of 10/24/19: 1.854 m (6' 1\").    Weight as of 10/24/19: 107 kg (236 lb).       Anesthesia Plan      History & Physical Review  History and physical reviewed and following examination; no interval change.    ASA Status:  3 .    NPO Status:  > 8 hours    Plan for General and ETT with Propofol and Intravenous induction. Maintenance will be Balanced.    PONV prophylaxis:  Ondansetron (or other 5HT-3) and Dexamethasone or Solumedrol  Hp 11/27/19  Supposed to stop taking Xarelto 5 days prior to surgery       Postoperative Care  Postoperative pain management:  IV analgesics and Oral pain medications.      Consents                 Aakash Aguayo, APRN CRNA  "

## 2019-12-10 ENCOUNTER — APPOINTMENT (OUTPATIENT)
Dept: ULTRASOUND IMAGING | Facility: HOSPITAL | Age: 49
End: 2019-12-10
Attending: PHYSICIAN ASSISTANT
Payer: COMMERCIAL

## 2019-12-10 ENCOUNTER — ANESTHESIA (OUTPATIENT)
Dept: SURGERY | Facility: HOSPITAL | Age: 49
End: 2019-12-10
Payer: COMMERCIAL

## 2019-12-10 ENCOUNTER — HOSPITAL ENCOUNTER (OUTPATIENT)
Facility: HOSPITAL | Age: 49
Discharge: HOME | End: 2019-12-10
Attending: OTOLARYNGOLOGY | Admitting: OTOLARYNGOLOGY
Payer: COMMERCIAL

## 2019-12-10 VITALS
SYSTOLIC BLOOD PRESSURE: 150 MMHG | DIASTOLIC BLOOD PRESSURE: 93 MMHG | BODY MASS INDEX: 31.54 KG/M2 | WEIGHT: 238 LBS | HEART RATE: 59 BPM | RESPIRATION RATE: 14 BRPM | OXYGEN SATURATION: 94 % | HEIGHT: 73 IN | TEMPERATURE: 97.4 F

## 2019-12-10 DIAGNOSIS — Z98.890 S/P FESS (FUNCTIONAL ENDOSCOPIC SINUS SURGERY): Primary | ICD-10-CM

## 2019-12-10 DIAGNOSIS — M79.661 RIGHT CALF PAIN: ICD-10-CM

## 2019-12-10 DIAGNOSIS — J32.4 CHRONIC PANSINUSITIS: ICD-10-CM

## 2019-12-10 PROCEDURE — 37000008 ZZH ANESTHESIA TECHNICAL FEE, 1ST 30 MIN: Performed by: OTOLARYNGOLOGY

## 2019-12-10 PROCEDURE — 99284 EMERGENCY DEPT VISIT MOD MDM: CPT | Mod: 25

## 2019-12-10 PROCEDURE — 36000056 ZZH SURGERY LEVEL 3 1ST 30 MIN: Performed by: OTOLARYNGOLOGY

## 2019-12-10 PROCEDURE — 31276 NSL/SINS NDSC FRNT TISS RMVL: CPT | Mod: 50 | Performed by: OTOLARYNGOLOGY

## 2019-12-10 PROCEDURE — 30520 REPAIR OF NASAL SEPTUM: CPT | Performed by: OTOLARYNGOLOGY

## 2019-12-10 PROCEDURE — 25000128 H RX IP 250 OP 636: Performed by: OTOLARYNGOLOGY

## 2019-12-10 PROCEDURE — 27110028 ZZH OR GENERAL SUPPLY NON-STERILE: Performed by: OTOLARYNGOLOGY

## 2019-12-10 PROCEDURE — 61782 SCAN PROC CRANIAL EXTRA: CPT | Performed by: OTOLARYNGOLOGY

## 2019-12-10 PROCEDURE — 25000125 ZZHC RX 250: Performed by: OTOLARYNGOLOGY

## 2019-12-10 PROCEDURE — 25000128 H RX IP 250 OP 636: Performed by: NURSE ANESTHETIST, CERTIFIED REGISTERED

## 2019-12-10 PROCEDURE — 31257 NSL/SINS NDSC TOT W/SPHENDT: CPT | Mod: 50 | Performed by: OTOLARYNGOLOGY

## 2019-12-10 PROCEDURE — 88304 TISSUE EXAM BY PATHOLOGIST: CPT | Mod: TC | Performed by: OTOLARYNGOLOGY

## 2019-12-10 PROCEDURE — 99135 ANES COMP CTRLD HYPOTENSION: CPT | Performed by: NURSE ANESTHETIST, CERTIFIED REGISTERED

## 2019-12-10 PROCEDURE — 36000058 ZZH SURGERY LEVEL 3 EA 15 ADDTL MIN: Performed by: OTOLARYNGOLOGY

## 2019-12-10 PROCEDURE — 25000132 ZZH RX MED GY IP 250 OP 250 PS 637: Performed by: NURSE ANESTHETIST, CERTIFIED REGISTERED

## 2019-12-10 PROCEDURE — 93971 EXTREMITY STUDY: CPT | Mod: TC,RT

## 2019-12-10 PROCEDURE — 27210794 ZZH OR GENERAL SUPPLY STERILE: Performed by: OTOLARYNGOLOGY

## 2019-12-10 PROCEDURE — 30930 THER FX NASAL INF TURBINATE: CPT | Mod: 51 | Performed by: OTOLARYNGOLOGY

## 2019-12-10 PROCEDURE — 31257 NSL/SINS NDSC TOT W/SPHENDT: CPT | Performed by: NURSE ANESTHETIST, CERTIFIED REGISTERED

## 2019-12-10 PROCEDURE — 25000125 ZZHC RX 250: Performed by: NURSE ANESTHETIST, CERTIFIED REGISTERED

## 2019-12-10 PROCEDURE — 25800030 ZZH RX IP 258 OP 636: Performed by: NURSE ANESTHETIST, CERTIFIED REGISTERED

## 2019-12-10 PROCEDURE — 31267 ENDOSCOPY MAXILLARY SINUS: CPT | Mod: 50 | Performed by: OTOLARYNGOLOGY

## 2019-12-10 PROCEDURE — C1726 CATH, BAL DIL, NON-VASCULAR: HCPCS | Performed by: OTOLARYNGOLOGY

## 2019-12-10 PROCEDURE — 37000009 ZZH ANESTHESIA TECHNICAL FEE, EACH ADDTL 15 MIN: Performed by: OTOLARYNGOLOGY

## 2019-12-10 PROCEDURE — 40000305 ZZH STATISTIC PRE PROC ASSESS I: Performed by: OTOLARYNGOLOGY

## 2019-12-10 PROCEDURE — 71000014 ZZH RECOVERY PHASE 1 LEVEL 2 FIRST HR: Performed by: OTOLARYNGOLOGY

## 2019-12-10 PROCEDURE — 25000125 ZZHC RX 250

## 2019-12-10 PROCEDURE — 99284 EMERGENCY DEPT VISIT MOD MDM: CPT | Mod: Z6 | Performed by: PHYSICIAN ASSISTANT

## 2019-12-10 PROCEDURE — 71000027 ZZH RECOVERY PHASE 2 EACH 15 MINS: Performed by: OTOLARYNGOLOGY

## 2019-12-10 RX ORDER — HYDROCODONE BITARTRATE AND ACETAMINOPHEN 7.5; 325 MG/1; MG/1
1 TABLET ORAL EVERY 6 HOURS PRN
Status: DISCONTINUED | OUTPATIENT
Start: 2019-12-10 | End: 2019-12-10 | Stop reason: HOSPADM

## 2019-12-10 RX ORDER — OXYMETAZOLINE HYDROCHLORIDE 0.05 G/100ML
SPRAY NASAL
Status: COMPLETED
Start: 2019-12-10 | End: 2019-12-10

## 2019-12-10 RX ORDER — FENTANYL CITRATE 50 UG/ML
25-50 INJECTION, SOLUTION INTRAMUSCULAR; INTRAVENOUS
Status: DISCONTINUED | OUTPATIENT
Start: 2019-12-10 | End: 2019-12-10 | Stop reason: HOSPADM

## 2019-12-10 RX ORDER — NALOXONE HYDROCHLORIDE 0.4 MG/ML
.1-.4 INJECTION, SOLUTION INTRAMUSCULAR; INTRAVENOUS; SUBCUTANEOUS
Status: DISCONTINUED | OUTPATIENT
Start: 2019-12-10 | End: 2019-12-10 | Stop reason: HOSPADM

## 2019-12-10 RX ORDER — ALBUTEROL SULFATE 0.83 MG/ML
2.5 SOLUTION RESPIRATORY (INHALATION) EVERY 4 HOURS PRN
Status: DISCONTINUED | OUTPATIENT
Start: 2019-12-10 | End: 2019-12-10 | Stop reason: HOSPADM

## 2019-12-10 RX ORDER — LIDOCAINE 40 MG/G
CREAM TOPICAL
Status: DISCONTINUED | OUTPATIENT
Start: 2019-12-10 | End: 2019-12-10 | Stop reason: HOSPADM

## 2019-12-10 RX ORDER — PREDNISONE 20 MG/1
TABLET ORAL
Qty: 5 TABLET | Refills: 0 | Status: SHIPPED | OUTPATIENT
Start: 2019-12-10 | End: 2020-01-09

## 2019-12-10 RX ORDER — OXYMETAZOLINE HYDROCHLORIDE 0.05 G/100ML
3 SPRAY NASAL
Status: COMPLETED | OUTPATIENT
Start: 2019-12-10 | End: 2019-12-10

## 2019-12-10 RX ORDER — SODIUM CHLORIDE, SODIUM LACTATE, POTASSIUM CHLORIDE, CALCIUM CHLORIDE 600; 310; 30; 20 MG/100ML; MG/100ML; MG/100ML; MG/100ML
INJECTION, SOLUTION INTRAVENOUS CONTINUOUS
Status: DISCONTINUED | OUTPATIENT
Start: 2019-12-10 | End: 2019-12-10 | Stop reason: HOSPADM

## 2019-12-10 RX ORDER — ONDANSETRON 4 MG/1
4 TABLET, ORALLY DISINTEGRATING ORAL EVERY 30 MIN PRN
Status: DISCONTINUED | OUTPATIENT
Start: 2019-12-10 | End: 2019-12-10 | Stop reason: HOSPADM

## 2019-12-10 RX ORDER — HYDROCODONE BITARTRATE AND ACETAMINOPHEN 7.5; 325 MG/1; MG/1
1 TABLET ORAL EVERY 6 HOURS PRN
Qty: 10 TABLET | Refills: 0 | Status: SHIPPED | OUTPATIENT
Start: 2019-12-10 | End: 2019-12-26

## 2019-12-10 RX ORDER — MEPERIDINE HYDROCHLORIDE 50 MG/ML
12.5 INJECTION INTRAMUSCULAR; INTRAVENOUS; SUBCUTANEOUS
Status: DISCONTINUED | OUTPATIENT
Start: 2019-12-10 | End: 2019-12-10 | Stop reason: HOSPADM

## 2019-12-10 RX ORDER — ONDANSETRON 2 MG/ML
4 INJECTION INTRAMUSCULAR; INTRAVENOUS EVERY 30 MIN PRN
Status: DISCONTINUED | OUTPATIENT
Start: 2019-12-10 | End: 2019-12-10 | Stop reason: HOSPADM

## 2019-12-10 RX ORDER — ONDANSETRON 2 MG/ML
INJECTION INTRAMUSCULAR; INTRAVENOUS PRN
Status: DISCONTINUED | OUTPATIENT
Start: 2019-12-10 | End: 2019-12-10 | Stop reason: HOSPADM

## 2019-12-10 RX ORDER — PROPOFOL 10 MG/ML
INJECTION, EMULSION INTRAVENOUS PRN
Status: DISCONTINUED | OUTPATIENT
Start: 2019-12-10 | End: 2019-12-10 | Stop reason: HOSPADM

## 2019-12-10 RX ORDER — TRIAMCINOLONE ACETONIDE 40 MG/ML
INJECTION, SUSPENSION INTRA-ARTICULAR; INTRAMUSCULAR PRN
Status: DISCONTINUED | OUTPATIENT
Start: 2019-12-10 | End: 2019-12-10 | Stop reason: HOSPADM

## 2019-12-10 RX ORDER — DEXAMETHASONE SODIUM PHOSPHATE 10 MG/ML
INJECTION, SOLUTION INTRAMUSCULAR; INTRAVENOUS PRN
Status: DISCONTINUED | OUTPATIENT
Start: 2019-12-10 | End: 2019-12-10 | Stop reason: HOSPADM

## 2019-12-10 RX ORDER — HYDROMORPHONE HYDROCHLORIDE 1 MG/ML
.3-.5 INJECTION, SOLUTION INTRAMUSCULAR; INTRAVENOUS; SUBCUTANEOUS EVERY 10 MIN PRN
Status: DISCONTINUED | OUTPATIENT
Start: 2019-12-10 | End: 2019-12-10 | Stop reason: HOSPADM

## 2019-12-10 RX ORDER — FENTANYL CITRATE 50 UG/ML
INJECTION, SOLUTION INTRAMUSCULAR; INTRAVENOUS PRN
Status: DISCONTINUED | OUTPATIENT
Start: 2019-12-10 | End: 2019-12-10 | Stop reason: HOSPADM

## 2019-12-10 RX ORDER — BUDESONIDE 0.5 MG/2ML
INHALANT ORAL
Qty: 3 BOX | Refills: 11 | Status: SHIPPED | OUTPATIENT
Start: 2019-12-10 | End: 2020-06-08

## 2019-12-10 RX ORDER — COCAINE HYDROCHLORIDE 40 MG/ML
SOLUTION NASAL
Status: DISCONTINUED
Start: 2019-12-10 | End: 2019-12-10 | Stop reason: HOSPADM

## 2019-12-10 RX ADMIN — ROCURONIUM BROMIDE 50 MG: 10 INJECTION INTRAVENOUS at 09:05

## 2019-12-10 RX ADMIN — ROCURONIUM BROMIDE 20 MG: 10 INJECTION INTRAVENOUS at 10:13

## 2019-12-10 RX ADMIN — PROPOFOL 200 MG: 10 INJECTION, EMULSION INTRAVENOUS at 09:05

## 2019-12-10 RX ADMIN — SODIUM CHLORIDE, POTASSIUM CHLORIDE, SODIUM LACTATE AND CALCIUM CHLORIDE: 600; 310; 30; 20 INJECTION, SOLUTION INTRAVENOUS at 10:12

## 2019-12-10 RX ADMIN — OXYMETAZOLINE HYDROCHLORIDE 3 SPRAY: 0.05 SPRAY NASAL at 08:05

## 2019-12-10 RX ADMIN — FENTANYL CITRATE 50 MCG: 50 INJECTION, SOLUTION INTRAMUSCULAR; INTRAVENOUS at 11:39

## 2019-12-10 RX ADMIN — ONDANSETRON 4 MG: 2 INJECTION INTRAMUSCULAR; INTRAVENOUS at 11:04

## 2019-12-10 RX ADMIN — SODIUM CHLORIDE, POTASSIUM CHLORIDE, SODIUM LACTATE AND CALCIUM CHLORIDE: 600; 310; 30; 20 INJECTION, SOLUTION INTRAVENOUS at 08:57

## 2019-12-10 RX ADMIN — FENTANYL CITRATE 50 MCG: 50 INJECTION, SOLUTION INTRAMUSCULAR; INTRAVENOUS at 10:28

## 2019-12-10 RX ADMIN — FENTANYL CITRATE 100 MCG: 50 INJECTION, SOLUTION INTRAMUSCULAR; INTRAVENOUS at 09:05

## 2019-12-10 RX ADMIN — OXYMETAZOLINE HYDROCHLORIDE 3 SPRAY: 0.05 SPRAY NASAL at 07:58

## 2019-12-10 RX ADMIN — DEXAMETHASONE SODIUM PHOSPHATE 12 MG: 10 INJECTION, SOLUTION INTRAMUSCULAR; INTRAVENOUS at 09:36

## 2019-12-10 RX ADMIN — HYDROCODONE BITARTRATE AND ACETAMINOPHEN 1 TABLET: 7.5; 325 TABLET ORAL at 12:26

## 2019-12-10 RX ADMIN — MIDAZOLAM 2 MG: 1 INJECTION INTRAMUSCULAR; INTRAVENOUS at 08:57

## 2019-12-10 RX ADMIN — OXYMETAZOLINE HYDROCHLORIDE 3 SPRAY: 0.05 SPRAY NASAL at 07:43

## 2019-12-10 RX ADMIN — SODIUM CHLORIDE, POTASSIUM CHLORIDE, SODIUM LACTATE AND CALCIUM CHLORIDE: 600; 310; 30; 20 INJECTION, SOLUTION INTRAVENOUS at 08:04

## 2019-12-10 RX ADMIN — FENTANYL CITRATE 50 MCG: 50 INJECTION, SOLUTION INTRAMUSCULAR; INTRAVENOUS at 12:34

## 2019-12-10 RX ADMIN — OXYMETAZOLINE HYDROCHLORIDE 3 SPRAY: 5 SPRAY NASAL at 07:43

## 2019-12-10 RX ADMIN — FENTANYL CITRATE 50 MCG: 50 INJECTION, SOLUTION INTRAMUSCULAR; INTRAVENOUS at 10:36

## 2019-12-10 ASSESSMENT — ENCOUNTER SYMPTOMS
FEVER: 0
SHORTNESS OF BREATH: 1

## 2019-12-10 ASSESSMENT — MIFFLIN-ST. JEOR: SCORE: 1998.44

## 2019-12-10 NOTE — OP NOTE
Otolaryngology Operative Note     Pre-op Diagnosis: Chronic pansinusitis, deviated nasal septum, bilateral inferior turbinate hypertrophy  Post-op Diagnosis:  same  Procedure:  1.  Bilateral frontal sinusotomy  2.  Bilateral total ethmoidectomy  3.  Bilateral sphenoidotomy  4.  Bilateral maxillary antrostomy with tissue removal  5.  Septoplasty with cartilage reinsertion  6.   Bilateral submucosal reduction inferior turbinates  Sinus procedures performed with Med[employer]onic navigation    Surgeon:  Marya Fraser D.O.  Anesthesia:  General endotracheal  EBL:  50 ml  Findings: polyposis ethmoid cavity, most pronounced in the anterior ethmoids  Complications:  none  Condition:  stable     Description of the Procedure  After surgical consent was obtained the patient was brought back to the operating room and laid in a comfortable and supine position.  The patient was administered a general anesthetic by a member of anesthesia.  The table was turned 180 degrees.  The patient was draped in the normal clean fashion and a timeout was taken.  The bed was placed into reverse Trendelenburg positioning.  A timeout was taken.  Cocaine pledgets were placed into the nares for several minutes and removed.  The lateral nasal wall, middle turbinates and inferior turbinates and septum were anesthetized with 1% lidocaine with 1-100,000 epinephrine.  The Medtronic navigation was found to be in working condition.    Attention was then turned to the submucous reduction of the inferior turbinates.  Attention was first turned to the right turbinate.  The coblation turbinate blade was advanced with coblation at 4 to 6 to the distal demarcation.  Submucosal reduction was performed for 10 seconds, the blade was then pulled back slightly to the proximal demarcation and a second 10 second reduction was performed.  The turbinate was outfractured with a septal displacer.   The left turbinate was reduced and outfractured in a similar fashion  with similar results.  A small amount of bacitracin ointment was placed into the nose bilaterally.    I made a right hemitransfixion incision 2 mm posterior to the caudal septum.   I then dissected down onto cartilaginous septum and created a right anterior inferior tunnel with a sultana elevator.  I then continued dissection through the right-sided incision to the left side. I then was able to start a mucoperichondrial pocket directly on the left side of the cartilaginous septum.  A left anterior-inferior as well as a posterior mucoperichondrial and periosteal flap was elevated with a sultana. After I completely elevated the mucoperichondrium off the left side of the septum, I then made a hemitransfixion incision through the cartilage approximately 1.5-2.0 cm back from the anterior edge. I broke over to the right side and raised a submucoperiosteal flap on the entire right side of the nasal septum.  I was able to carefully tease the mucoperichondrium off the large left spur. After this was done, I used a swivel knife to remove the entire rhomboid portion of the cartilaginous septum, and I removed it in one large piece. It was brought to the back table and morselized, and straightened with hash marks on the concavity.  I  then reinserted the cartilage back into the mucoperichondrial pocket. I laid the flaps back together and this significantly improved the nasal airway. I then closed my septoplasty incision with a running horizontal mattress suture of 4-0 vicryl and chronic.  The hemitransfixion incision was closed with interrupted 4-0 chromic.  The septum is intact and midline.      Next I turned my attention to the max antrostomy on the right side.  I used a 30 degree endoscope the uncinate process was identified I remove the uncinate process with backbiting Blakesley's.  The right maxillary sinus was entered at the 2 ostia with the relieve a balloon and guidewire transillumination was used to ensure that I was in  the maxillary sinus.  The ostium was dilated to 12 mm atmospheric pressure and deflated.  The sinus was irrigated with Ancef and saline and gently suctioned.  The antrostomy is slightly widened with the microdebrider blade and is patent the mucosa is flat and healthy.  Polyps were noted and completely filling the middle meatus.  These were removed with the microdebrider blade.  Identified the ethmoid bulla and entered the ethmoid bulla inferior and medial with a straight suction.  I identified the lamina and preserved the lamina throughout.  Ethmoid septations as well as polyps were removed with Blakesley forceps and the microdebrider blade.  There is slight oozing throughout but no arterial bleeding.  The ground lamella was penetrated inferior medial identified the skull base and operated from a posterior anterior fashion removing ethmoid septations.  I then directed a curved suction to the supraorbital ethmoid cells and removed these with up-biting Blakesley's.  Next I entered the posterior ethmoid cavity and directed the straight suction into the face of the sphenoid which is entered inferior and medial within the ethmoid cavity.  The sphenoid sinus was enlarged with the microdebrider blade the mucosa is healthy next I entered the frontal recess with a curved suction and the relieve a guide and wire.  Transillumination confirmed I was in the left frontal sinus which is dilated with 3 serial dilations at 12 mm atmospheric pressure.  The balloon was deflated and the sinuses irrigated with Ancef and saline the balloon was deflated and the relieve a system was removed.  The sinuses were again irrigated and gently suctioned hemostasis is adequate and was achieved with scant use of electrocautery.      Next I turned my attention to the right side right frontal sinusotomy total ethmoidectomy maxillary antrostomy and frontal sinusotomy were performed with similar findings and results of diffuse ethmoid polyposis  particularly in the anterior ethmoid cavity.  There was slight oozing throughout.  Nasal pore soaked in Kenalog was placed in the ethmoid cavity bilaterally.    The patient tolerated the procedure well, was handed back over to anesthesia and brought to the recovery room in stable condition.

## 2019-12-10 NOTE — OR NURSING
Dr. Fraser would like to follow up with right leg pain, transferring to ER for further evaulation, pain in nasal area 1/10. Right leg upper calf pain 2/10 and irritating. Will transfer to Franco in room 6 ER.

## 2019-12-10 NOTE — ED TRIAGE NOTES
Patient was in and had a nasal/sinus surgery. After surgery he was complaining of some upper calf pain to this right calf. He is on Xerelto however prior to the surgery he was off it for 5 days. Does have a hx of DVT's so he was brought here to be evaluated

## 2019-12-10 NOTE — ANESTHESIA POSTPROCEDURE EVALUATION
Patient: Franco Alvarez    Procedure(s):  BILATERAL ENDOSCOPIC SINUS SURGERY  SEPTOPLASTY, TURBINATE REDUCTION    Diagnosis:Chronic pansinusitis [J32.4]  Nose polyp [J33.9]  Deviated nasal septum [J34.2]  Hypertrophy of nasal turbinates [J34.3]  Tobacco abuse counseling [Z71.6]  Disturbances of sensation of smell and taste [R43.9]  Diagnosis Additional Information: No value filed.    Anesthesia Type:  General, ETT    Note:  Anesthesia Post Evaluation    Patient location during evaluation: PACU  Patient participation: Able to fully participate in evaluation  Level of consciousness: awake  Pain management: adequate  Airway patency: patent  Cardiovascular status: acceptable  Respiratory status: acceptable  Hydration status: acceptable  PONV: none             Last vitals:  Vitals:    12/10/19 0730 12/10/19 1120   BP: 143/94    Pulse: 52    Resp: 16    Temp:  97.3  F (36.3  C)   SpO2: 96%          Electronically Signed By: MAGDALENO Hutson CRNA  December 10, 2019  11:25 AM

## 2019-12-10 NOTE — ANESTHESIA CARE TRANSFER NOTE
Patient: Franco Alvarez    Procedure(s):  BILATERAL ENDOSCOPIC SINUS SURGERY  SEPTOPLASTY, TURBINATE REDUCTION    Diagnosis: Chronic pansinusitis [J32.4]  Nose polyp [J33.9]  Deviated nasal septum [J34.2]  Hypertrophy of nasal turbinates [J34.3]  Tobacco abuse counseling [Z71.6]  Disturbances of sensation of smell and taste [R43.9]  Diagnosis Additional Information: No value filed.    Anesthesia Type:   General, ETT     Note:  Airway :Face Mask  Patient transferred to:PACU  Comments: Patient awake and verbal.  Appropriate.Handoff Report: Identified the Reponsible Provider, Reviewed the pertinent medical history, Discussed the surgical course, Reviewed Intra-OP anesthesia mangement and issues during anesthesia, Set expectations for post-procedure period, Allowed opportunity for questions and acknowledgement of understanding and Identifed the Patient      Vitals: (Last set prior to Anesthesia Care Transfer)    CRNA VITALS  12/10/2019 1046 - 12/10/2019 1124      12/10/2019             NIBP:  111/68    Pulse:  74    NIBP Mean:  85    SpO2:  100 %    Resp Rate (observed):  (!) 2    Resp Rate (set):  8                Electronically Signed By: MAGDALENO Hutson CRNA  December 10, 2019  11:24 AM

## 2019-12-10 NOTE — ED NOTES
Per provider ADRI Gregg he does not need any paper work she's done her part and he got info and was discharged from surgery. She stated they didn't need any paperwork she would tell them verbally.

## 2019-12-10 NOTE — PROGRESS NOTES
Call to ER, spoke with Dr Horton, ED physician.  Advised patient's PMH of Factor V Leiden deficiency, has been off Xarelto for 5 days pre-op.  He is having right sided calf pain after FESS today.  He will be seen in ED for DVT work up.  If DVT is negative he will restart Xarelto today.   Dr Horton accepted report    Kary ALANIZ  [facility] ENT  12:51 PM  December 10, 2019

## 2019-12-10 NOTE — OR NURSING
Pt complaining of pain in right upper calf. Pt has history of leg DVTS and does take anticouagulant, has been off xelarto for 5 days.

## 2019-12-11 LAB — COPATH REPORT: NORMAL

## 2019-12-11 NOTE — ADDENDUM NOTE
Addendum  created 12/11/19 1009 by Aakash Aguayo APRN CRNA    Intraprocedure Event edited, Intraprocedure Staff edited

## 2019-12-11 NOTE — ED PROVIDER NOTES
History     Chief Complaint   Patient presents with     Deep Vein Thrombosis     HPI  Franco Alvarez is a 49 year old male with PMH significant for factor V Leiden, prior DVT as well as PE who presents to the emergency department following surgery.  He was waking up from a functional endoscopic sinus surgery today by Dr. Fraser when he commented on discomfort in his right calf.  He is chronically on Xarelto but this was held for the 5 days prior to surgery.  He denies any chest pain or dyspnea.  He does feel somewhat sedated currently due to medications given during surgery.    Allergies:  Allergies   Allergen Reactions     Seasonal Allergies        Problem List:    Patient Active Problem List    Diagnosis Date Noted     Obstructive sleep apnea (adult) (pediatric) 07/12/2019     Priority: Medium     Pulmonary embolism (H) 12/20/2013     Priority: Medium     Updated per 10/1/17 IMO import       Right leg DVT (H) 12/20/2013     Priority: Medium     Sprain of thoracic region 04/14/2010     Priority: Medium     IMO Update 10/11       Sprain of lumbar region 03/15/2010     Priority: Medium     IMO Update 10/11       Pain in joint, pelvic region and thigh 04/23/2008     Priority: Medium     IMO Update 10/11       Finger injury 03/19/2008     Priority: Medium     Phlebitis and thrombophlebitis of other deep vessels of lower extremities 11/24/2004     Priority: Medium     Primary hypercoagulable state (H) 11/24/2004     Priority: Medium        Past Medical History:    Past Medical History:   Diagnosis Date     Anxiety disorder      Asthma      Factor V Leiden (H)      Migraine headache        Past Surgical History:    History reviewed. No pertinent surgical history.    Family History:    Family History   Problem Relation Age of Onset     Thrombophilia Other         Blood Clots - legs     Diabetes Mother        Social History:  Marital Status:   [2]  Social History     Tobacco Use     Smoking status: Light  "Tobacco Smoker     Smokeless tobacco: Never Used     Tobacco comment: 3-4 cigs daily   Substance Use Topics     Alcohol use: Yes     Comment: occasionally     Drug use: Never        Medications:    Albuterol (VENTOLIN IN)  budesonide (PULMICORT) 0.5 MG/2ML neb solution  citalopram (CELEXA) 20 MG tablet  fluticasone (FLONASE) 50 MCG/ACT nasal spray  HYDROcodone-acetaminophen (NORCO) 7.5-325 MG per tablet  losartan (COZAAR) 50 MG tablet  metoprolol succinate ER (TOPROL-XL) 100 MG 24 hr tablet  Multiple Vitamins-Minerals (MULTIVITAMIN ADULT PO)  pantoprazole (PROTONIX) 40 MG EC tablet  predniSONE (DELTASONE) 20 MG tablet  predniSONE (DELTASONE) 20 MG tablet  rivaroxaban ANTICOAGULANT (XARELTO) 20 MG TABS tablet          Review of Systems   Constitutional: Negative for fever.   Respiratory: Positive for shortness of breath.    Allergic/Immunologic: Negative for immunocompromised state.   Neurological: Negative for syncope.       Physical Exam   BP: 143/94  Pulse: 52  Heart Rate: 65  Temp: 97.3  F (36.3  C)  Resp: 16  Height: 185.4 cm (6' 1\")  Weight: 108 kg (238 lb)  SpO2: 96 %      Physical Exam  Vitals signs and nursing note reviewed.   Constitutional:       General: He is not in acute distress.     Appearance: Normal appearance. He is not ill-appearing, toxic-appearing or diaphoretic.      Comments: Groggy, heavy eyelids   HENT:      Head: Normocephalic and atraumatic.   Neck:      Musculoskeletal: Normal range of motion.   Cardiovascular:      Rate and Rhythm: Normal rate and regular rhythm.      Pulses: Normal pulses.      Comments: DP, PT pulses 3+  Pulmonary:      Effort: Pulmonary effort is normal.      Breath sounds: Normal breath sounds.   Musculoskeletal:      Comments: Negative Akin sign. Patient moves RLE fully at hip, knee, ankle. Sensation intact. No overlying skin changes, no edema of RLE   Neurological:      Mental Status: He is oriented to person, place, and time.         ED Course     ED Course as of " Dec 10 2146   Tue Dec 10, 2019   1418 Met criteria for discharge; pain controlled, urinated.         Procedures               Critical Care time:  none               Results for orders placed or performed during the hospital encounter of 12/10/19 (from the past 24 hour(s))   US Lower Extremity Venous Duplex Right    Narrative    Exam:US LOWER EXTREMITY VENOUS DUPLEX RIGHT    History: Recent surgery right leg pain    Comparisons: None    Technique: Venous duplex ultrasonography of the right lower extremity  was performed.     Findings: The common femoral vein, superficial femoral vein and  popliteal vein are fully compressible with spontaneous and augmentable  venous flow.           Impression    Impression: No evidence of deep venous thrombosis within the right  lower extremity.    ROGELIO PEREZ MD       Medications   oxymetazoline (AFRIN) 0.05 % spray 3 spray (3 sprays Both Nostrils Given 12/10/19 0805)       Assessments & Plan (with Medical Decision Making)     I have reviewed the nursing notes.    I have reviewed the findings, diagnosis, plan and need for follow up with the patient.   Patient is a 49-year-old male sent to ER from the PACU for concerns of right lower extremity pain.  He has history of factor V Leiden and has been off his Xarelto for 5 days.  Physical exam is reassuring against signs of DVT.  However given his history of DVT and PE as well as the cramping in his right calf, did obtain ultrasound.  This was negative for acute DVT.  Patient was offered reassurance and explained this is likely a side effect from the medications as well as potentially some minor dehydration.  He is asked to follow-up with his PCP in about 1 week if his symptoms persist. He will be resuming Xarelto.     Discharge Medication List as of 12/10/2019  2:28 PM      START taking these medications    Details   HYDROcodone-acetaminophen (NORCO) 7.5-325 MG per tablet Take 1 tablet by mouth every 6 hours as needed for severe  pain, Disp-10 tablet, R-0, Local Print      !! predniSONE (DELTASONE) 20 MG tablet 20 mg in the morning for days 1-4, then 10 mg (half tab) days 5 and 6, Disp-5 tablet, R-0, E-Prescribe       !! - Potential duplicate medications found. Please discuss with provider.          Final diagnoses:   Right calf pain       10/24/2019   [facility]    Maru Gregg PA-C  12/10/19 0578

## 2019-12-26 ENCOUNTER — OFFICE VISIT (OUTPATIENT)
Dept: OTOLARYNGOLOGY | Facility: OTHER | Age: 49
End: 2019-12-26
Attending: PHYSICIAN ASSISTANT
Payer: COMMERCIAL

## 2019-12-26 VITALS
HEART RATE: 80 BPM | DIASTOLIC BLOOD PRESSURE: 70 MMHG | HEIGHT: 73 IN | TEMPERATURE: 96.4 F | WEIGHT: 238 LBS | SYSTOLIC BLOOD PRESSURE: 120 MMHG | OXYGEN SATURATION: 95 % | BODY MASS INDEX: 31.54 KG/M2

## 2019-12-26 DIAGNOSIS — Z98.890 S/P FESS (FUNCTIONAL ENDOSCOPIC SINUS SURGERY): Primary | ICD-10-CM

## 2019-12-26 DIAGNOSIS — Z98.890 S/P NASAL SEPTOPLASTY: ICD-10-CM

## 2019-12-26 DIAGNOSIS — G89.18 POSTOPERATIVE PAIN: ICD-10-CM

## 2019-12-26 PROCEDURE — 99024 POSTOP FOLLOW-UP VISIT: CPT | Performed by: PHYSICIAN ASSISTANT

## 2019-12-26 PROCEDURE — 31231 NASAL ENDOSCOPY DX: CPT | Mod: 58 | Performed by: PHYSICIAN ASSISTANT

## 2019-12-26 RX ORDER — HYDROCODONE BITARTRATE AND ACETAMINOPHEN 7.5; 325 MG/1; MG/1
1 TABLET ORAL EVERY 6 HOURS PRN
Qty: 2 TABLET | Refills: 0 | Status: SHIPPED | OUTPATIENT
Start: 2019-12-26 | End: 2020-01-09

## 2019-12-26 ASSESSMENT — MIFFLIN-ST. JEOR: SCORE: 1998.44

## 2019-12-26 ASSESSMENT — PAIN SCALES - GENERAL: PAINLEVEL: NO PAIN (0)

## 2019-12-26 NOTE — LETTER
12/26/2019         RE: Franco Alvarez  [address]  [facility] [address]        Dear Colleague,    Thank you for referring your patient, Franco Alvarez, to the [facility]. Please see a copy of my visit note below.    Chief Complaint   Patient presents with     Surgical Followup     Pt is s/p fess, septoplasty, and TR 12/10/19.           HPI - Franco Alvarez is here for their first postoperative visit, status post endoscopic sinus surgery (with septoplasty and turbinate reduction) performed on 12/10/19.  There was the expected amount of congestion which has now mostly resolved, and no bleeding has occurred.        The generalized facial pressure has been resolving, and there have been no fevers or chills since the first postoperative visit.  The sinus rinses are continuing three times per day, and are being tolerated well.  No visual changes. He has been rinsing Budesonide BID.   His smell and taste are not been improved.  Breathing has improved.   Pain is tolerable.       Operative Note- 12/10/19  Procedure:  1.  Bilateral frontal sinusotomy  2.  Bilateral total ethmoidectomy  3.  Bilateral sphenoidotomy  4.  Bilateral maxillary antrostomy with tissue removal  5.  Septoplasty with cartilage reinsertion  6.   Bilateral submucosal reduction inferior turbinates  Sinus procedures performed with [employer] navigation  Nasopore was placed in bilateral ethmoid.     Past Medical History:   Diagnosis Date     Anxiety disorder      Asthma      Factor V Leiden (H)      Migraine headache         Allergies   Allergen Reactions     Seasonal Allergies      Current Outpatient Medications   Medication     Albuterol (VENTOLIN IN)     budesonide (PULMICORT) 0.5 MG/2ML neb solution     citalopram (CELEXA) 20 MG tablet     fluticasone (FLONASE) 50 MCG/ACT nasal spray     losartan (COZAAR) 50 MG tablet     metoprolol succinate ER (TOPROL-XL) 100 MG 24 hr tablet     Multiple Vitamins-Minerals (MULTIVITAMIN ADULT  "PO)     pantoprazole (PROTONIX) 40 MG EC tablet     predniSONE (DELTASONE) 20 MG tablet     predniSONE (DELTASONE) 20 MG tablet     rivaroxaban ANTICOAGULANT (XARELTO) 20 MG TABS tablet     No current facility-administered medications for this visit.       ROS: 10 point ROS neg other than the symptoms noted above in the HPI.  /70   Pulse 80   Temp 96.4  F (35.8  C) (Tympanic)   Ht 1.854 m (6' 1\")   Wt 108 kg (238 lb)   SpO2 95%   BMI 31.40 kg/m       General - The patient is awake and alert, and answers questions appropriately during the history and physical.  The vocal quality is hypernasal, but there is no dyspnea or stridor noted.  Eyes - The EOMI, there is no conjuncitval or scleral injection.  Pupils are equally round and reactive to light.  Oral - The oral mucosa is pink and moist.  The tongue is mobile and midline on protrusion, no edema noted.  To evaluate the nose and sinuses in the post operative state, I performed rigid nasal endoscopy. The LPN had previously sprayed both nares with lidocaine and neosynephrine.    I began with the LEFT side using a 0 degree rigid nasal endoscope, and then similarly examined the RIGHT side    Findings:  Septum: Midline, there is crusting anteriorly. Small perforation inferior to crusting.   Inferior turbinates:  Reduced.   Middle turbinate and middle meatus:  No purulence, no polyposis, no synechiae  Ethmoid cavity appears with clots. Partially removed to patient's tolerance. He does move throughout exam.   Mucosa is healthy throughout without polyps nor polypoid degeneration      ASSESSMENT:      ICD-10-CM    1. S/P FESS (functional endoscopic sinus surgery) Z98.890 HYDROcodone-acetaminophen (NORCO) 7.5-325 MG per tablet   2. S/P nasal septoplasty Z98.890    3. Postoperative pain G89.18 HYDROcodone-acetaminophen (NORCO) 7.5-325 MG per tablet         Maintain postoperative instructions.   Continue w/ Budesonide rinse BID  Continue with Roberto med rinse 3+ times " daily.   Use nasal saline PRN  Follow up as scheduled with Dr. Fraser in 2-3 weeks.   Lortab #2 sent in. He will take one, one hour before his appointment with Dr. Fraser. He will have a  as well.     \    Debbie Pathak PA-C  ENT  [facility], [place]  [phone]        Again, thank you for allowing me to participate in the care of your patient.        Sincerely,        Debbie Patahk PA-C

## 2019-12-26 NOTE — PATIENT INSTRUCTIONS
Maintain postoperative instructions.   Continue w/ Budesonide rinse twice a day    Continue with Roberto med rinse 3+ times daily.     Follow up as scheduled with Dr. Fraser in 2-3 weeks.   Take pain medication prior to next appointment, must have .       Thank you for allowing Debbie Pathak PA-C and our ENT team to participate in your care.  If your medications are too expensive, please give the nurse a call.  We can possibly change this medication.  If you have a scheduling or an appointment question please contact our Health Unit Coordinator at their direct line [phone].   ALL nursing questions or concerns can be directed to your ENT nurse at: [phone] [place]

## 2019-12-26 NOTE — NURSING NOTE
"Chief Complaint   Patient presents with     Surgical Followup     Pt is s/p fess, septoplasty, and TR 12/10/19.       Initial /70   Pulse 80   Temp 96.4  F (35.8  C) (Tympanic)   Ht 1.854 m (6' 1\")   Wt 108 kg (238 lb)   SpO2 95%   BMI 31.40 kg/m   Estimated body mass index is 31.4 kg/m  as calculated from the following:    Height as of this encounter: 1.854 m (6' 1\").    Weight as of this encounter: 108 kg (238 lb).  Medication Reconciliation: complete  Mimi Murcia LPN  "

## 2019-12-26 NOTE — PROGRESS NOTES
Chief Complaint   Patient presents with     Surgical Followup     Pt is s/p fess, septoplasty, and TR 12/10/19.           HPI - Franco Alvarez is here for their first postoperative visit, status post endoscopic sinus surgery (with septoplasty and turbinate reduction) performed on 12/10/19.  There was the expected amount of congestion which has now mostly resolved, and no bleeding has occurred.        The generalized facial pressure has been resolving, and there have been no fevers or chills since the first postoperative visit.  The sinus rinses are continuing three times per day, and are being tolerated well.  No visual changes. He has been rinsing Budesonide BID.   His smell and taste are not been improved.  Breathing has improved.   Pain is tolerable.       Operative Note- 12/10/19  Procedure:  1.  Bilateral frontal sinusotomy  2.  Bilateral total ethmoidectomy  3.  Bilateral sphenoidotomy  4.  Bilateral maxillary antrostomy with tissue removal  5.  Septoplasty with cartilage reinsertion  6.   Bilateral submucosal reduction inferior turbinates  Sinus procedures performed with [employer] navigation  Nasopore was placed in bilateral ethmoid.     Past Medical History:   Diagnosis Date     Anxiety disorder      Asthma      Factor V Leiden (H)      Migraine headache         Allergies   Allergen Reactions     Seasonal Allergies      Current Outpatient Medications   Medication     Albuterol (VENTOLIN IN)     budesonide (PULMICORT) 0.5 MG/2ML neb solution     citalopram (CELEXA) 20 MG tablet     fluticasone (FLONASE) 50 MCG/ACT nasal spray     losartan (COZAAR) 50 MG tablet     metoprolol succinate ER (TOPROL-XL) 100 MG 24 hr tablet     Multiple Vitamins-Minerals (MULTIVITAMIN ADULT PO)     pantoprazole (PROTONIX) 40 MG EC tablet     predniSONE (DELTASONE) 20 MG tablet     predniSONE (DELTASONE) 20 MG tablet     rivaroxaban ANTICOAGULANT (XARELTO) 20 MG TABS tablet     No current facility-administered medications for this  "visit.       ROS: 10 point ROS neg other than the symptoms noted above in the HPI.  /70   Pulse 80   Temp 96.4  F (35.8  C) (Tympanic)   Ht 1.854 m (6' 1\")   Wt 108 kg (238 lb)   SpO2 95%   BMI 31.40 kg/m      General - The patient is awake and alert, and answers questions appropriately during the history and physical.  The vocal quality is hypernasal, but there is no dyspnea or stridor noted.  Eyes - The EOMI, there is no conjuncitval or scleral injection.  Pupils are equally round and reactive to light.  Oral - The oral mucosa is pink and moist.  The tongue is mobile and midline on protrusion, no edema noted.  To evaluate the nose and sinuses in the post operative state, I performed rigid nasal endoscopy. The LPN had previously sprayed both nares with lidocaine and neosynephrine.    I began with the LEFT side using a 0 degree rigid nasal endoscope, and then similarly examined the RIGHT side    Findings:  Septum: Midline, there is crusting anteriorly. Small perforation inferior to crusting.   Inferior turbinates:  Reduced.   Middle turbinate and middle meatus:  No purulence, no polyposis, no synechiae  Ethmoid cavity appears with clots. Partially removed to patient's tolerance. He does move throughout exam.   Mucosa is healthy throughout without polyps nor polypoid degeneration      ASSESSMENT:      ICD-10-CM    1. S/P FESS (functional endoscopic sinus surgery) Z98.890 HYDROcodone-acetaminophen (NORCO) 7.5-325 MG per tablet   2. S/P nasal septoplasty Z98.890    3. Postoperative pain G89.18 HYDROcodone-acetaminophen (NORCO) 7.5-325 MG per tablet         Maintain postoperative instructions.   Continue w/ Budesonide rinse BID  Continue with Roberto med rinse 3+ times daily.   Use nasal saline PRN  Follow up as scheduled with Dr. Fraser in 2-3 weeks.   Lortab #2 sent in. He will take one, one hour before his appointment with Dr. Fraser. He will have a  as well.     \    Debbie Pathak, " HANK  ENT  [facility], [place]  [phone]

## 2020-01-09 ENCOUNTER — OFFICE VISIT (OUTPATIENT)
Dept: OTOLARYNGOLOGY | Facility: OTHER | Age: 50
End: 2020-01-09
Attending: OTOLARYNGOLOGY
Payer: COMMERCIAL

## 2020-01-09 ENCOUNTER — TELEPHONE (OUTPATIENT)
Dept: ALLERGY | Facility: OTHER | Age: 50
End: 2020-01-09

## 2020-01-09 VITALS
HEART RATE: 62 BPM | BODY MASS INDEX: 31.54 KG/M2 | WEIGHT: 238 LBS | OXYGEN SATURATION: 97 % | TEMPERATURE: 97.3 F | DIASTOLIC BLOOD PRESSURE: 80 MMHG | SYSTOLIC BLOOD PRESSURE: 124 MMHG | HEIGHT: 73 IN

## 2020-01-09 DIAGNOSIS — J33.1 POLYPOID SINUS DEGENERATION: ICD-10-CM

## 2020-01-09 DIAGNOSIS — Z98.890 HISTORY OF ENDOSCOPIC SINUS SURGERY: ICD-10-CM

## 2020-01-09 DIAGNOSIS — J32.4 CHRONIC PANSINUSITIS: Primary | ICD-10-CM

## 2020-01-09 DIAGNOSIS — D72.10 EOSINOPHILIA: ICD-10-CM

## 2020-01-09 PROCEDURE — 99024 POSTOP FOLLOW-UP VISIT: CPT | Performed by: OTOLARYNGOLOGY

## 2020-01-09 PROCEDURE — 31237 NSL/SINS NDSC SURG BX POLYPC: CPT | Mod: 79 | Performed by: OTOLARYNGOLOGY

## 2020-01-09 RX ORDER — PREDNISONE 20 MG/1
TABLET ORAL
Qty: 8 TABLET | Refills: 1 | Status: SHIPPED | OUTPATIENT
Start: 2020-01-09 | End: 2020-03-03

## 2020-01-09 ASSESSMENT — MIFFLIN-ST. JEOR: SCORE: 1998.44

## 2020-01-09 ASSESSMENT — PAIN SCALES - GENERAL: PAINLEVEL: NO PAIN (0)

## 2020-01-09 NOTE — PROGRESS NOTES
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  Bilateral frontal sinusotomy  2.  Bilateral total ethmoidectomy  3.  Bilateral sphenoidotomy  4.  Bilateral maxillary antrostomy with tissue removal  5.  Septoplasty with cartilage reinsertion  6.   Bilateral submucosal reduction inferior turbinates  Sinus procedures performed with [employer] navigation     Surgeon:  Marya Fraser D.O.  Anesthesia:  General endotracheal  EBL:  50 ml  Findings: polyposis ethmoid cavity, most pronounced in the anterior ethmoids    She did have some calf pain postoperatively and he has factor V Leyden deficiency with prior DVTs and PE so I did send him to the emergency room directly from same-day surgery on 1210 and  lower extremity US was negative for DVT       He is on Xarelto       Physical Exam  /80   Pulse 62   Temp 97.3  F (36.3  C) (Tympanic)   Ht 1.854 m (6' 1\")   Wt 108 kg (238 lb)   SpO2 97%   BMI 31.40 kg/m    General - The patient is well nourished and well developed, and appears to have good nutritional status.  Alert and oriented to person and place, interactive.  Eyes - Extraocular movements intact.   Ears- External auditory canals are patent, tympanic membranes are intact without effusion or worrisome retractions   Nose - narrow nasal vault bilaterally. Nasal " mucosa is pink and moist with no abnormal mucus.  The septum was grossly midline and there is a anterior septal perforation, slight granulation, turbinates of normal size and position.  No polyps, masses, or purulence noted on examination.      To evaluate the nose and sinuses in the post operative state, I performed rigid nasal endoscopy. The LPN had previously sprayed both nares with lidocaine and neosynephrine.    I began with the LEFT side using a 0 degree rigid nasal endoscope, and then similarly examined the RIGHT side    Findings:  Inferior turbinates:  Lateralized  I used an 8 Blount to debride normal secretions and the remainder of the nasal pore  Middle turbinate and middle meatus:  No purulence, no polyposis, no synechiae  Antrostomy patent  Ethmoid cavity clear  Mucosa is polypoid and mildly edematous throughout no obstructive polyps    Impression/Plan  Franco Alvarez is a 49 year old male    ICD-10-CM    1. Chronic pansinusitis J32.4 predniSONE (DELTASONE) 20 MG tablet   2. 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 Sinus Eosinophilia D72.1    4. History of endoscopic sinus surgery Z98.890        Recommend Intradermal testing due to eosinophilia and chronic recurrent sinusitis with hx of seasonal allergic rhinitis     Risks of oral steroid use were discussed and include psychiatric/mood changes, insomnia, stomach ulcers and potential GI bleeding, blood sugar elevation/worsening diabetes, hip/bone necrosis or bone demineralization.        Complete Allergy Skin Testing  Complete Prednisone Taper  Continue Budesonide and Roberto Med Rinses  Complete Audiogram  He is trying to look for another job with less less exposures and this would obviously help his chronic sinusitis  Follow up with Debbie Pathak after Audiogram and Allergy Test  I recommend starting dymista and daily AH after testing      Indications for allergy testing include:   1) Confirm suspicion of allergic rhinitis due to inhalant allergies  2)  Identify the offending allergen to determine specific mode of treatment  3) In the case of chronic rhinosinusitis: when symptoms are not controlled by avoidance and pharmacotherapy  4) In the Asthma patient when exacerbations may be due to perennial allergen exposure  5) Suspect food allergy  6) Otitis Media, chronic rhinitis, atopic dermatitis, Meniere disease, headache, pharyngitis or eye symptoms    Modified quantitative testing (MQT) will be performed.  Signed consent was obtained, and the risks of immunotherapy were discussed, including the potential for anaphylaxis.    If immunotherapy (IT) is recommended, there is continued risk of anaphylaxis.   Anaphylaxis can cause death. The patient will need to be monitored for 30 minutes post injection.  They must present their epinephrine pen prior to injection.  Subcutaneous as well as sublingual immunotherapy (SLIT) were discussed as potential treatment options.  The patient was told SLIT is not approved by the FDA and is cash pay.  The general time frame of immunotherapy was discussed (generally 3-5 years, sometimes longer), and the basic immunology behind IT was discussed.    Marya Fraser D.O.  Otolaryngology/Head and Neck Surgery  Allergy

## 2020-01-09 NOTE — NURSING NOTE
"Chief Complaint   Patient presents with     Surgical Followup     S/P FESS, Septoplasty, Turbinate Reduction on 12/10/19       Initial /80   Pulse 62   Temp 97.3  F (36.3  C) (Tympanic)   Ht 1.854 m (6' 1\")   Wt 108 kg (238 lb)   SpO2 97%   BMI 31.40 kg/m   Estimated body mass index is 31.4 kg/m  as calculated from the following:    Height as of this encounter: 1.854 m (6' 1\").    Weight as of this encounter: 108 kg (238 lb).  Medication Reconciliation: complete  Mimi Murcia LPN  "

## 2020-01-09 NOTE — PATIENT INSTRUCTIONS
Thank you for allowing Dr. Fraser and our ENT team to participate in your care.  If your medications are too expensive, please give the nurse a call.  We can possibly change this medication.  If you have a scheduling or an appointment question please contact our Health Unit Coordinator at their direct line [phone].   ALL nursing questions or concerns can be directed to your ENT nurse at: [phone] Yaneth Chiu    Complete Allergy Skin Testing  Complete Prednisone Taper  Continue Budesonide and Roberto Med Rinses  Complete Audiogram  Follow up with Debbie Pathak after Audiogram and Allergy Test    Budesonide nasal saline irrigation per instructions:  -Obtain Roberto Med Sinus rinse over the counter.    -Use warm distilled water and 2 packets of the salt solution that comes with the bottle, dissolve in bottle up to the 240 mL cori.  -Add 1 vial of budesonide.  -Irrigate each side of your nose leaning over the sink, using 1/3 to 1/2 the volume of the bottle in each nostril every irrigation.  Irrigate 2 times daily.  -If additional rinses are needed/recommended, you may use the plan Roberto Med Sinus irrigation without the use of added budesonide.         Indications for allergy testing include:   1) Confirm suspicion of allergic rhinitis due to inhalant allergies  2) Identify the offending allergen to determine specific mode of treatment  3) In the case of chronic rhinosinusitis: when symptoms are not controlled by avoidance and pharmacotherapy  4) In the Asthma patient when exacerbations may be due to perennial allergen exposure  5) Suspect food allergy  6) Otitis Media, chronic rhinitis, atopic dermatitis, Meniere disease, headache, pharyngitis or eye symptoms    Modified quantitative testing (MQT) will be performed.  Signed consent was obtained, and the risks of immunotherapy were discussed, including the potential for anaphylaxis.    If immunotherapy (IT) is recommended, there is continued risk of anaphylaxis.    Anaphylaxis can cause death. The patient will need to be monitored for 30 minutes post injection.  They must present their epinephrine pen prior to injection.  Subcutaneous as well as sublingual immunotherapy (SLIT) were discussed as potential treatment options.  The patient was told SLIT is not approved by the FDA and is cash pay.  The general time frame of immunotherapy was discussed (generally 3-5 years, sometimes longer), and the basic immunology behind IT was discussed.

## 2020-01-09 NOTE — LETTER
"    1/9/2020         RE: Franco Alvarez  [address]  [facility] [address]        Dear Colleague,    Thank you for referring your patient, Franco Alvarez, to the [facility]. Please see a copy of my visit note below.    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  Bilateral frontal sinusotomy  2.  Bilateral total ethmoidectomy  3.  Bilateral sphenoidotomy  4.  Bilateral maxillary antrostomy with tissue removal  5.  Septoplasty with cartilage reinsertion  6.   Bilateral submucosal reduction inferior turbinates  Sinus procedures performed with [employer] navigation     Surgeon:  Marya Fraser D.O.  Anesthesia:  General endotracheal  EBL:  50 ml  Findings: polyposis ethmoid cavity, most pronounced in the anterior ethmoids    She did have some calf pain postoperatively and he has factor V Leyden deficiency with prior DVTs and PE so I did send him to the emergency room directly from same-day surgery on 1210 and  lower extremity US was negative for DVT       He is on Xarelto       Physical Exam  /80   Pulse 62   Temp 97.3  F (36.3  C) (Tympanic)   Ht 1.854 m (6' 1\")   Wt 108 kg (238 lb)   SpO2 97%   BMI 31.40 kg/m     General - The patient is well nourished and well developed, and appears to have good nutritional status.  Alert and " oriented to person and place, interactive.  Eyes - Extraocular movements intact.   Ears- External auditory canals are patent, tympanic membranes are intact without effusion or worrisome retractions   Nose - narrow nasal vault bilaterally. Nasal mucosa is pink and moist with no abnormal mucus.  The septum was grossly midline and there is a anterior septal perforation, slight granulation, turbinates of normal size and position.  No polyps, masses, or purulence noted on examination.      To evaluate the nose and sinuses in the post operative state, I performed rigid nasal endoscopy. The LPN had previously sprayed both nares with lidocaine and neosynephrine.    I began with the LEFT side using a 0 degree rigid nasal endoscope, and then similarly examined the RIGHT side    Findings:  Inferior turbinates:  Lateralized  I used an 8 Blount to debride normal secretions and the remainder of the nasal pore  Middle turbinate and middle meatus:  No purulence, no polyposis, no synechiae  Antrostomy patent  Ethmoid cavity clear  Mucosa is polypoid and mildly edematous throughout no obstructive polyps    Impression/Plan  Franco Alvarez is a 49 year old male    ICD-10-CM    1. Chronic pansinusitis J32.4 predniSONE (DELTASONE) 20 MG tablet   2. 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 Sinus Eosinophilia D72.1    4. History of endoscopic sinus surgery Z98.890        Recommend Intradermal testing due to eosinophilia and chronic recurrent sinusitis with hx of seasonal allergic rhinitis     Risks of oral steroid use were discussed and include psychiatric/mood changes, insomnia, stomach ulcers and potential GI bleeding, blood sugar elevation/worsening diabetes, hip/bone necrosis or bone demineralization.        Complete Allergy Skin Testing  Complete Prednisone Taper  Continue Budesonide and Roberot Med Rinses  Complete Audiogram  He is trying to look for another job with less less exposures and this would obviously help his chronic  sinusitis  Follow up with Debbie Pathak after Audiogram and Allergy Test  I recommend starting dymista and daily AH after testing      Indications for allergy testing include:   1) Confirm suspicion of allergic rhinitis due to inhalant allergies  2) Identify the offending allergen to determine specific mode of treatment  3) In the case of chronic rhinosinusitis: when symptoms are not controlled by avoidance and pharmacotherapy  4) In the Asthma patient when exacerbations may be due to perennial allergen exposure  5) Suspect food allergy  6) Otitis Media, chronic rhinitis, atopic dermatitis, Meniere disease, headache, pharyngitis or eye symptoms    Modified quantitative testing (MQT) will be performed.  Signed consent was obtained, and the risks of immunotherapy were discussed, including the potential for anaphylaxis.    If immunotherapy (IT) is recommended, there is continued risk of anaphylaxis.   Anaphylaxis can cause death. The patient will need to be monitored for 30 minutes post injection.  They must present their epinephrine pen prior to injection.  Subcutaneous as well as sublingual immunotherapy (SLIT) were discussed as potential treatment options.  The patient was told SLIT is not approved by the FDA and is cash pay.  The general time frame of immunotherapy was discussed (generally 3-5 years, sometimes longer), and the basic immunology behind IT was discussed.    Marya Fraser D.O.  Otolaryngology/Head and Neck Surgery  Allergy                  Again, thank you for allowing me to participate in the care of your patient.        Sincerely,        Marya Fraser MD

## 2020-01-09 NOTE — TELEPHONE ENCOUNTER
Attempted to reach patient regarding MQT allergy testing instructions/scheduling, no answer.  Left message for patient to return call.    Rosita Bhandari RN

## 2020-01-17 NOTE — TELEPHONE ENCOUNTER
Attempted to reach patient regarding MQT allergy testing, no answer.  Left message for patient to return call.    Rosita Bhandari RN

## 2020-01-30 ENCOUNTER — TELEPHONE (OUTPATIENT)
Dept: ALLERGY | Facility: OTHER | Age: 50
End: 2020-01-30

## 2020-01-30 NOTE — TELEPHONE ENCOUNTER
Call placed to [facility] to see if patient can hold his beta-blocker for 3 days for allergy testing.  PCP is Nai Echeverria NP.  A message will be routed to PCP for review.  Explained a signed, written order is needed if patient can hold this medication.      Rosita Bhandari RN

## 2020-02-03 ENCOUNTER — MEDICAL CORRESPONDENCE (OUTPATIENT)
Dept: HEALTH INFORMATION MANAGEMENT | Facility: CLINIC | Age: 50
End: 2020-02-03

## 2020-02-03 NOTE — TELEPHONE ENCOUNTER
Faxed order received from [facility], signed by Nai Echeverria CNP, for patient to hold his beta-blocker for allergy testing.  Original sent to be scanned.      Patient previously stated it was ok to leave a voicemail if he was not available.  Left message stating patient can hold his beta-blocker for 3 days before testing and to call if he has any questions.    Rosita Bhandari RN

## 2020-02-03 NOTE — TELEPHONE ENCOUNTER
Faxed order received from [employer] [facility], signed by Nai Echeverria CNP, for patient to hold his beta-blocker for allergy testing.  Original sent to be scanned.      Patient previously stated it was ok to leave a voicemail if he was not available.  Left message stating patient can hold his beta-blocker for 3 days before testing and to call if he has any questions.    Rosita Bhandari RN

## 2020-02-20 DIAGNOSIS — H91.93 DECREASED HEARING OF BOTH EARS: Primary | ICD-10-CM

## 2020-02-25 ENCOUNTER — TRANSFERRED RECORDS (OUTPATIENT)
Dept: HEALTH INFORMATION MANAGEMENT | Facility: CLINIC | Age: 50
End: 2020-02-25

## 2020-02-25 ENCOUNTER — MEDICAL CORRESPONDENCE (OUTPATIENT)
Dept: HEALTH INFORMATION MANAGEMENT | Facility: CLINIC | Age: 50
End: 2020-02-25

## 2020-02-25 LAB
CREAT SERPL-MCNC: 0.8 MG/DL (ref 0.7–1.4)
GFR SERPL CREATININE-BSD FRML MDRD: >60 ML/MIN/1.73M2
GLUCOSE SERPL-MCNC: 92 MG/DL (ref 64–112)
POTASSIUM SERPL-SCNC: 4.1 MMOL/L (ref 3.5–5.3)

## 2020-02-27 ENCOUNTER — OFFICE VISIT (OUTPATIENT)
Dept: AUDIOLOGY | Facility: OTHER | Age: 50
End: 2020-02-27
Attending: OTOLARYNGOLOGY
Payer: COMMERCIAL

## 2020-02-27 DIAGNOSIS — H93.13 TINNITUS, BILATERAL: Primary | ICD-10-CM

## 2020-02-27 DIAGNOSIS — H91.93 DECREASED HEARING OF BOTH EARS: ICD-10-CM

## 2020-02-27 PROCEDURE — 92550 TYMPANOMETRY & REFLEX THRESH: CPT | Performed by: AUDIOLOGIST

## 2020-02-27 PROCEDURE — 92557 COMPREHENSIVE HEARING TEST: CPT | Performed by: AUDIOLOGIST

## 2020-02-27 NOTE — PROGRESS NOTES
Audiology Evaluation Completed. Please refer SCANNED AUDIOGRAM and/or TYMPANOGRAM for BACKGROUND, RESULTS, RECOMMENDATIONS.      Zohra CALDWELL, [facility]  Audiologist #[id number]

## 2020-03-03 ENCOUNTER — OFFICE VISIT (OUTPATIENT)
Dept: OTOLARYNGOLOGY | Facility: OTHER | Age: 50
End: 2020-03-03
Attending: PHYSICIAN ASSISTANT
Payer: COMMERCIAL

## 2020-03-03 ENCOUNTER — OFFICE VISIT (OUTPATIENT)
Dept: ALLERGY | Facility: OTHER | Age: 50
End: 2020-03-03
Attending: PHYSICIAN ASSISTANT
Payer: COMMERCIAL

## 2020-03-03 VITALS
TEMPERATURE: 97.2 F | HEART RATE: 68 BPM | OXYGEN SATURATION: 97 % | HEIGHT: 73 IN | DIASTOLIC BLOOD PRESSURE: 87 MMHG | BODY MASS INDEX: 31.54 KG/M2 | WEIGHT: 238 LBS | SYSTOLIC BLOOD PRESSURE: 141 MMHG

## 2020-03-03 DIAGNOSIS — J32.4 CHRONIC PANSINUSITIS: Primary | ICD-10-CM

## 2020-03-03 DIAGNOSIS — Z98.890 S/P FESS (FUNCTIONAL ENDOSCOPIC SINUS SURGERY): ICD-10-CM

## 2020-03-03 DIAGNOSIS — J30.89 PERENNIAL ALLERGIC RHINITIS: ICD-10-CM

## 2020-03-03 DIAGNOSIS — D72.10 EOSINOPHILIA: ICD-10-CM

## 2020-03-03 DIAGNOSIS — D72.10 EOSINOPHILIA: Primary | ICD-10-CM

## 2020-03-03 DIAGNOSIS — J33.1 POLYPOID SINUS DEGENERATION: ICD-10-CM

## 2020-03-03 PROCEDURE — 99213 OFFICE O/P EST LOW 20 MIN: CPT | Mod: 25 | Performed by: PHYSICIAN ASSISTANT

## 2020-03-03 PROCEDURE — 31231 NASAL ENDOSCOPY DX: CPT | Performed by: PHYSICIAN ASSISTANT

## 2020-03-03 PROCEDURE — 95004 PERQ TESTS W/ALRGNC XTRCS: CPT

## 2020-03-03 PROCEDURE — 95024 IQ TESTS W/ALLERGENIC XTRCS: CPT

## 2020-03-03 RX ORDER — CETIRIZINE HYDROCHLORIDE 10 MG/1
10 TABLET ORAL DAILY
Qty: 30 TABLET | Refills: 11 | Status: SHIPPED | OUTPATIENT
Start: 2020-03-03 | End: 2020-06-08

## 2020-03-03 RX ORDER — FLUTICASONE PROPIONATE 50 MCG
2 SPRAY, SUSPENSION (ML) NASAL DAILY
Qty: 16 G | Refills: 11 | Status: SHIPPED | OUTPATIENT
Start: 2020-03-03

## 2020-03-03 RX ORDER — BUDESONIDE 0.5 MG/2ML
INHALANT ORAL
Qty: 2 BOX | Refills: 1 | Status: SHIPPED | OUTPATIENT
Start: 2020-03-03

## 2020-03-03 ASSESSMENT — ASTHMA QUESTIONNAIRES
QUESTION_2 LAST FOUR WEEKS HOW OFTEN HAVE YOU HAD SHORTNESS OF BREATH: ONCE OR TWICE A WEEK
QUESTION_1 LAST FOUR WEEKS HOW MUCH OF THE TIME DID YOUR ASTHMA KEEP YOU FROM GETTING AS MUCH DONE AT WORK, SCHOOL OR AT HOME: A LITTLE OF THE TIME
QUESTION_4 LAST FOUR WEEKS HOW OFTEN HAVE YOU USED YOUR RESCUE INHALER OR NEBULIZER MEDICATION (SUCH AS ALBUTEROL): ONE OR TWO TIMES PER DAY
ACT_TOTALSCORE: 19
QUESTION_5 LAST FOUR WEEKS HOW WOULD YOU RATE YOUR ASTHMA CONTROL: WELL CONTROLLED
ACUTE_EXACERBATION_TODAY: NO
QUESTION_3 LAST FOUR WEEKS HOW OFTEN DID YOUR ASTHMA SYMPTOMS (WHEEZING, COUGHING, SHORTNESS OF BREATH, CHEST TIGHTNESS OR PAIN) WAKE YOU UP AT NIGHT OR EARLIER THAN USUAL IN THE MORNING: NOT AT ALL

## 2020-03-03 ASSESSMENT — MIFFLIN-ST. JEOR: SCORE: 1998.44

## 2020-03-03 ASSESSMENT — PAIN SCALES - GENERAL: PAINLEVEL: NO PAIN (0)

## 2020-03-03 NOTE — LETTER
3/3/2020         RE: Franco Alvarez  [address]  [facility] [address]        Dear Colleague,    Thank you for referring your patient, Franco Alvarez, to the [facility]. Please see a copy of my visit note below.    Chief Complaint   Patient presents with     Other     MQT allergy skin testing         Patient returns for recheck of FESS and MQT  He has noticed some improvement since his surgery.  He is using Roberto med rinses BID, Budesonide.   Roberto Med more often, depending  Upon the day he will use more often.   He is rinsing and seeing good results.   No recent use of Flonase.   No AH use.     He has not used his CPAP,ALIS.       MQT completed today.   Dilution #6-Ragweed, grass, birch, elm, oak, aspen, dust  Dilution #5-cat, dog, molds, maple, earnestine, pine, walnut, pigweed, thistle.   Dilution #2-  Molds.       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  Bilateral frontal sinusotomy  2.  Bilateral total ethmoidectomy  3.  Bilateral sphenoidotomy  4.  Bilateral maxillary antrostomy with tissue removal  5.  Septoplasty with cartilage reinsertion  6.   Bilateral submucosal reduction inferior turbinates  Sinus procedures performed with [employer] navigation     Surgeon:  Marya Fraser D.O.  Anesthesia:  General endotracheal  EBL:  50 ml  Findings: polyposis ethmoid cavity, most pronounced in the anterior  "ethmoids      Past Medical History:   Diagnosis Date     Anxiety disorder      Asthma      Factor V Leiden (H)      Migraine headache         Allergies   Allergen Reactions     Seasonal Allergies      Current Outpatient Medications   Medication     Albuterol (VENTOLIN IN)     budesonide (PULMICORT) 0.5 MG/2ML neb solution     citalopram (CELEXA) 20 MG tablet     fluticasone (FLONASE) 50 MCG/ACT nasal spray     metoprolol succinate ER (TOPROL-XL) 100 MG 24 hr tablet     Multiple Vitamins-Minerals (MULTIVITAMIN ADULT PO)     pantoprazole (PROTONIX) 40 MG EC tablet     predniSONE (DELTASONE) 20 MG tablet     rivaroxaban ANTICOAGULANT (XARELTO) 20 MG TABS tablet     No current facility-administered medications for this visit.       ROS: 10 point ROS neg other than the symptoms noted above in the HPI.  BP (!) 141/87 (BP Location: Right arm, Patient Position: Sitting, Cuff Size: Adult Regular)   Pulse 68   Temp 97.2  F (36.2  C) (Oral)   Ht 1.854 m (6' 1\")   Wt 108 kg (238 lb)   SpO2 97%   BMI 31.40 kg/m       General - The patient is well nourished and well developed, and appears to have good nutritional status.  Alert and oriented to person and place, interactive.  Eyes - Extraocular movements intact.   Ears- External auditory canals are patent, tympanic membranes are intact without effusion or worrisome retractions   Nose - narrow nasal vault bilaterally. Nasal mucosa is pink and moist with no abnormal mucus.  The septum was grossly midline and there is a anterior septal perforation, slight granulation, turbinates of normal size and position.  No polyps, masses, or purulence noted on examination.        To evaluate the nose and sinuses in the post operative state, I performed rigid nasal endoscopy. The LPN had previously sprayed both nares with lidocaine and neosynephrine.     I began with the LEFT side using a 0 degree rigid nasal endoscope, and then similarly examined the RIGHT side     Findings:  Inferior " turbinates:  Lateralized  Middle turbinate and middle meatus:  No purulence, no polyposis, no synechiae  Antrostomy patent  Ethmoid cavity clear  Mucosa is polypoid and mildly edematous throughout no obstructive polyps    ASSESSMENT:      ICD-10-CM    1. Chronic pansinusitis J32.4 cetirizine (ZYRTEC) 10 MG tablet     fluticasone (FLONASE) 50 MCG/ACT nasal spray     budesonide (PULMICORT) 0.5 MG/2ML neb solution   2. Polypoid sinus degeneration J33.1 cetirizine (ZYRTEC) 10 MG tablet     fluticasone (FLONASE) 50 MCG/ACT nasal spray     budesonide (PULMICORT) 0.5 MG/2ML neb solution   3. Perennial allergic rhinitis J30.89 cetirizine (ZYRTEC) 10 MG tablet     fluticasone (FLONASE) 50 MCG/ACT nasal spray     budesonide (PULMICORT) 0.5 MG/2ML neb solution   4. Sinus Eosinophilia D72.1    5. S/P FESS (functional endoscopic sinus surgery) Z98.890    Start nasal saline 2 sprays to each nostril throughout the day for dryness.   Continue with Budesonide rinses. Rinse 2 times daily for at least the next 2 months.   Use Roberto med rinse often/ pending exposures.     Start Flonase 2 sprays to each nostril daily.   Start Zyrtec one tablet at night.     Consider Singulair or dysmita if no improvement with the above.       Consider allergy therapy-   Follow up with Nai MARAVILLA, for change of beta blocker before starting allergy therapy.   Highly recommended SCIT or SLIT based on MQT and recent sinus surgery/ symptoms.     Follow up with Nataliya in [facility] in 2-3 months for recheck.           Debbie Pathak PA-C  ENT  [facility], [place]  [phone]      Again, thank you for allowing me to participate in the care of your patient.        Sincerely,        Debbie Pathak PA-C

## 2020-03-03 NOTE — PROGRESS NOTES
Franco was seen for allergy skin testing. Patient was seen by this nurse in conjunction with ENT provider. All encounter details are documented in ENT Provider's appointment from this same date. Please see referenced encounter for this visits documentation.   Minda Wilson RN

## 2020-03-03 NOTE — PROGRESS NOTES
Chief Complaint   Patient presents with     Other     MQT allergy skin testing         Patient returns for recheck of FESS and MQT  He has noticed some improvement since his surgery.  He is using Roberto med rinses BID, Budesonide.   Roberto Med more often, depending  Upon the day he will use more often.   He is rinsing and seeing good results.   No recent use of Flonase.   No AH use.     He has not used his CPAP,ALIS.       MQT completed today.   Dilution #6-Ragweed, grass, birch, elm, oak, aspen, dust  Dilution #5-cat, dog, molds, maple, earnestine, pine, walnut, pigweed, thistle.   Dilution #2-  Molds.       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  Bilateral frontal sinusotomy  2.  Bilateral total ethmoidectomy  3.  Bilateral sphenoidotomy  4.  Bilateral maxillary antrostomy with tissue removal  5.  Septoplasty with cartilage reinsertion  6.   Bilateral submucosal reduction inferior turbinates  Sinus procedures performed with [employer] navigation     Surgeon:  Marya Fraser D.O.  Anesthesia:  General endotracheal  EBL:  50 ml  Findings: polyposis ethmoid cavity, most pronounced in the anterior ethmoids      Past Medical History:   Diagnosis Date     Anxiety disorder      Asthma      Factor V Leiden (H)      Migraine headache         Allergies   Allergen Reactions     Seasonal Allergies      Current Outpatient Medications   Medication      "Albuterol (VENTOLIN IN)     budesonide (PULMICORT) 0.5 MG/2ML neb solution     citalopram (CELEXA) 20 MG tablet     fluticasone (FLONASE) 50 MCG/ACT nasal spray     metoprolol succinate ER (TOPROL-XL) 100 MG 24 hr tablet     Multiple Vitamins-Minerals (MULTIVITAMIN ADULT PO)     pantoprazole (PROTONIX) 40 MG EC tablet     predniSONE (DELTASONE) 20 MG tablet     rivaroxaban ANTICOAGULANT (XARELTO) 20 MG TABS tablet     No current facility-administered medications for this visit.       ROS: 10 point ROS neg other than the symptoms noted above in the HPI.  BP (!) 141/87 (BP Location: Right arm, Patient Position: Sitting, Cuff Size: Adult Regular)   Pulse 68   Temp 97.2  F (36.2  C) (Oral)   Ht 1.854 m (6' 1\")   Wt 108 kg (238 lb)   SpO2 97%   BMI 31.40 kg/m      General - The patient is well nourished and well developed, and appears to have good nutritional status.  Alert and oriented to person and place, interactive.  Eyes - Extraocular movements intact.   Ears- External auditory canals are patent, tympanic membranes are intact without effusion or worrisome retractions   Nose - narrow nasal vault bilaterally. Nasal mucosa is pink and moist with no abnormal mucus.  The septum was grossly midline and there is a anterior septal perforation, slight granulation, turbinates of normal size and position.  No polyps, masses, or purulence noted on examination.        To evaluate the nose and sinuses in the post operative state, I performed rigid nasal endoscopy. The LPN had previously sprayed both nares with lidocaine and neosynephrine.     I began with the LEFT side using a 0 degree rigid nasal endoscope, and then similarly examined the RIGHT side     Findings:  Inferior turbinates:  Lateralized  Middle turbinate and middle meatus:  No purulence, no polyposis, no synechiae  Antrostomy patent  Ethmoid cavity clear  Mucosa is polypoid and mildly edematous throughout no obstructive polyps    ASSESSMENT:      ICD-10-CM    1. " Chronic pansinusitis J32.4 cetirizine (ZYRTEC) 10 MG tablet     fluticasone (FLONASE) 50 MCG/ACT nasal spray     budesonide (PULMICORT) 0.5 MG/2ML neb solution   2. Polypoid sinus degeneration J33.1 cetirizine (ZYRTEC) 10 MG tablet     fluticasone (FLONASE) 50 MCG/ACT nasal spray     budesonide (PULMICORT) 0.5 MG/2ML neb solution   3. Perennial allergic rhinitis J30.89 cetirizine (ZYRTEC) 10 MG tablet     fluticasone (FLONASE) 50 MCG/ACT nasal spray     budesonide (PULMICORT) 0.5 MG/2ML neb solution   4. Sinus Eosinophilia D72.1    5. S/P FESS (functional endoscopic sinus surgery) Z98.890    Start nasal saline 2 sprays to each nostril throughout the day for dryness.   Continue with Budesonide rinses. Rinse 2 times daily for at least the next 2 months.   Use Roberto med rinse often/ pending exposures.     Start Flonase 2 sprays to each nostril daily.   Start Zyrtec one tablet at night.     Consider Singulair or dysmita if no improvement with the above.       Consider allergy therapy-   Follow up with Nai MARAVILLA, for change of beta blocker before starting allergy therapy.   Highly recommended SCIT or SLIT based on MQT and recent sinus surgery/ symptoms.     Follow up with Nataliya in [facility] in 2-3 months for recheck.           Debbie Pathak PA-C  ENT  [facility], [place]  [phone]

## 2020-03-03 NOTE — PATIENT INSTRUCTIONS
Start nasal saline 2 sprays to each nostril throughout the day for dryness.   Continue with Budesonide rinses. Rinse 2 times daily for at least the next 2 months.   Use Roberto med rinse often/ pending exposures.     Start Flonase 2 sprays to each nostril daily.   Start Zyrtec one tablet at night.     Consider allergy therapy-   Follow up with Nai MARAVILLA, for change of beta blocker before starting allergy therapy.     Thank you for allowing Debbie Pathak PA-C and our ENT team to participate in your care.  If your medications are too expensive, please give the nurse a call.  We can possibly change this medication.  If you have a scheduling or an appointment question please contact our Health Unit Coordinator at their direct line [phone].   ALL nursing questions or concerns can be directed to your ENT nurse at: [phone] [place]

## 2020-03-03 NOTE — NURSING NOTE
"Chief Complaint   Patient presents with     Other     MQT allergy skin testing       Initial BP (!) 141/87 (BP Location: Right arm, Patient Position: Sitting, Cuff Size: Adult Regular)   Pulse 68   Temp 97.2  F (36.2  C) (Oral)   Ht 1.854 m (6' 1\")   Wt 108 kg (238 lb)   SpO2 97%   BMI 31.40 kg/m   Estimated body mass index is 31.4 kg/m  as calculated from the following:    Height as of this encounter: 1.854 m (6' 1\").    Weight as of this encounter: 108 kg (238 lb).  Medication Reconciliation: complete     Prior to testing, the patient's identity is verified using name and date of birth.  Franco presents for allergy skin testing.      The patient's symptoms have included a long history of sinus issues, with resent sinus surgery by Dr. Fraser.    He had ongoing seasonal allergies as a child, which worsened as he grew up.    He notes having asthma, which began when he was young.  An ACT is done and documented in Epic.    He notes constant constant throat clearing, post nasal drainage, itchy dry skin and bad headaches.  He states he has no sense of smell.  All symptoms are worse during the spring and fall seasons.    The patient lives in a single family home that was built in [birth year].  It has a basement that has mold issues.  The patient is currently remodeling the home.  The home has no carpet or AC.     The family has 1 inside dog that does not sleep with them.    The patient denies allergy testing in the past.    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  He notes his nose, throat and mouth are constantly dry.  He drinks tons of water, but it does not seem to help.  He is worse while at work.      He has ALIS and wears CPAP, but has had to quit using it due to worsening dryness in his throat, and mouth.  He states he is up several times a night for water.      All of the patients medications are reviewed prior to testing.  All appropriate medications have been stopped.         Consent is signed by the patient " and signature is verified.    MQT/ID test is performed per protocol.  The patient tolerated testing well.  Benadryl cooling gel is applied to all test sites, and the 2 children's chewable Claritin are administered to the patient; both per standing orders for post test itching.    All findings are recorded on the paper flow sheet. Results are reviewed with the patient.  He is given written information regarding allergy.      The patient will follow-up with MARTIN Mcmillan for treatment plan.      WENDI Somers RN

## 2020-03-04 ASSESSMENT — ASTHMA QUESTIONNAIRES: ACT_TOTALSCORE: 19

## 2020-03-30 ENCOUNTER — TELEPHONE (OUTPATIENT)
Dept: SURGERY | Facility: OTHER | Age: 50
End: 2020-03-30

## 2020-03-30 NOTE — TELEPHONE ENCOUNTER
Mirta Hernandez LPN contacted Franco on 03/30/20 and left a message. If patient calls back please schedule appointment in 1 month - due to the COVID-19.    Mirta Hernandez LPN

## 2020-06-03 DIAGNOSIS — J32.4 CHRONIC PANSINUSITIS: ICD-10-CM

## 2020-06-03 DIAGNOSIS — J33.1 POLYPOID SINUS DEGENERATION: ICD-10-CM

## 2020-06-03 DIAGNOSIS — J30.89 PERENNIAL ALLERGIC RHINITIS: ICD-10-CM

## 2020-06-03 NOTE — TELEPHONE ENCOUNTER
Please call this patient back as soon as possible. He needs some allergy drop refills along with some other meds. He is unsure if he is to schedule another follow up with Dr. Fraser at this point. Pt is frustrated with lack of communication from Provider/Nurse.     His meds are delivered thru [facility].

## 2020-06-03 NOTE — TELEPHONE ENCOUNTER
[facility]   Cetirizine  Last Written Prescription Date: 3/3/20  Last Fill Quantity: 30 # of Refills: 11  Last Office Visit: 3/3/20    flonase  Last Written Prescription Date: 3/3/20  Last Fill Quantity: 16g # of Refills: 11  Last Office Visit: 3/3/20    Budesonide  Last Written Prescription Date: 3/3/20  Last Fill Quantity: 2 boxes # of Refills: 1  Last Office Visit: 3/3/20

## 2020-06-08 RX ORDER — FLUTICASONE PROPIONATE 50 MCG
2 SPRAY, SUSPENSION (ML) NASAL DAILY
Qty: 16 G | Refills: 3 | Status: SHIPPED | OUTPATIENT
Start: 2020-06-08

## 2020-06-08 RX ORDER — CETIRIZINE HYDROCHLORIDE 10 MG/1
10 TABLET ORAL DAILY
Qty: 30 TABLET | Refills: 11 | Status: SHIPPED | OUTPATIENT
Start: 2020-06-08

## 2020-06-08 RX ORDER — BUDESONIDE 0.5 MG/2ML
INHALANT ORAL
Qty: 3 BOX | Refills: 11 | Status: SHIPPED | OUTPATIENT
Start: 2020-06-08

## 2021-06-04 DIAGNOSIS — R06.02 SHORTNESS OF BREATH: Primary | ICD-10-CM

## 2021-08-17 ENCOUNTER — MEDICAL CORRESPONDENCE (OUTPATIENT)
Dept: HEALTH INFORMATION MANAGEMENT | Facility: CLINIC | Age: 51
End: 2021-08-17

## 2022-03-04 ENCOUNTER — APPOINTMENT (OUTPATIENT)
Dept: GENERAL RADIOLOGY | Facility: HOSPITAL | Age: 52
End: 2022-03-04
Attending: INTERNAL MEDICINE
Payer: COMMERCIAL

## 2022-03-04 ENCOUNTER — HOSPITAL ENCOUNTER (EMERGENCY)
Facility: HOSPITAL | Age: 52
Discharge: HOME | End: 2022-03-04
Attending: INTERNAL MEDICINE | Admitting: INTERNAL MEDICINE
Payer: COMMERCIAL

## 2022-03-04 VITALS
BODY MASS INDEX: 31.92 KG/M2 | DIASTOLIC BLOOD PRESSURE: 95 MMHG | SYSTOLIC BLOOD PRESSURE: 149 MMHG | TEMPERATURE: 99.2 F | WEIGHT: 228 LBS | OXYGEN SATURATION: 95 % | RESPIRATION RATE: 12 BRPM | HEART RATE: 72 BPM | HEIGHT: 71 IN

## 2022-03-04 DIAGNOSIS — F41.9 ANXIETY: ICD-10-CM

## 2022-03-04 LAB
ALBUMIN SERPL-MCNC: 3.8 G/DL (ref 3.4–5)
ALP SERPL-CCNC: 67 U/L (ref 40–150)
ALT SERPL W P-5'-P-CCNC: 26 U/L (ref 0–70)
ANION GAP SERPL CALCULATED.3IONS-SCNC: 6 MMOL/L (ref 3–14)
AST SERPL W P-5'-P-CCNC: 16 U/L (ref 0–45)
BASOPHILS # BLD AUTO: 0 10E3/UL (ref 0–0.2)
BASOPHILS NFR BLD AUTO: 0 %
BILIRUB SERPL-MCNC: 0.2 MG/DL (ref 0.2–1.3)
BUN SERPL-MCNC: 18 MG/DL (ref 7–30)
CALCIUM SERPL-MCNC: 8.9 MG/DL (ref 8.5–10.1)
CHLORIDE BLD-SCNC: 105 MMOL/L (ref 94–109)
CO2 SERPL-SCNC: 26 MMOL/L (ref 20–32)
CREAT SERPL-MCNC: 0.78 MG/DL (ref 0.66–1.25)
EOSINOPHIL # BLD AUTO: 0.4 10E3/UL (ref 0–0.7)
EOSINOPHIL NFR BLD AUTO: 4 %
ERYTHROCYTE [DISTWIDTH] IN BLOOD BY AUTOMATED COUNT: 14 % (ref 10–15)
GFR SERPL CREATININE-BSD FRML MDRD: >90 ML/MIN/1.73M2
GLUCOSE BLD-MCNC: 115 MG/DL (ref 70–99)
HCT VFR BLD AUTO: 39.5 % (ref 40–53)
HGB BLD-MCNC: 13.2 G/DL (ref 13.3–17.7)
IMM GRANULOCYTES # BLD: 0 10E3/UL
IMM GRANULOCYTES NFR BLD: 0 %
LYMPHOCYTES # BLD AUTO: 3.1 10E3/UL (ref 0.8–5.3)
LYMPHOCYTES NFR BLD AUTO: 32 %
MCH RBC QN AUTO: 29.9 PG (ref 26.5–33)
MCHC RBC AUTO-ENTMCNC: 33.4 G/DL (ref 31.5–36.5)
MCV RBC AUTO: 90 FL (ref 78–100)
MONOCYTES # BLD AUTO: 0.8 10E3/UL (ref 0–1.3)
MONOCYTES NFR BLD AUTO: 8 %
NEUTROPHILS # BLD AUTO: 5.2 10E3/UL (ref 1.6–8.3)
NEUTROPHILS NFR BLD AUTO: 56 %
NRBC # BLD AUTO: 0 10E3/UL
NRBC BLD AUTO-RTO: 0 /100
PLATELET # BLD AUTO: 255 10E3/UL (ref 150–450)
POTASSIUM BLD-SCNC: 3.7 MMOL/L (ref 3.4–5.3)
PROT SERPL-MCNC: 7.4 G/DL (ref 6.8–8.8)
RBC # BLD AUTO: 4.41 10E6/UL (ref 4.4–5.9)
SODIUM SERPL-SCNC: 137 MMOL/L (ref 133–144)
TROPONIN I SERPL HS-MCNC: 9 NG/L
WBC # BLD AUTO: 9.5 10E3/UL (ref 4–11)

## 2022-03-04 PROCEDURE — 36415 COLL VENOUS BLD VENIPUNCTURE: CPT | Performed by: INTERNAL MEDICINE

## 2022-03-04 PROCEDURE — 85025 COMPLETE CBC W/AUTO DIFF WBC: CPT | Performed by: INTERNAL MEDICINE

## 2022-03-04 PROCEDURE — 93005 ELECTROCARDIOGRAM TRACING: CPT

## 2022-03-04 PROCEDURE — 99285 EMERGENCY DEPT VISIT HI MDM: CPT | Mod: 25

## 2022-03-04 PROCEDURE — 93010 ELECTROCARDIOGRAM REPORT: CPT | Performed by: INTERNAL MEDICINE

## 2022-03-04 PROCEDURE — 84484 ASSAY OF TROPONIN QUANT: CPT | Performed by: INTERNAL MEDICINE

## 2022-03-04 PROCEDURE — 99284 EMERGENCY DEPT VISIT MOD MDM: CPT | Performed by: INTERNAL MEDICINE

## 2022-03-04 PROCEDURE — 250N000013 HC RX MED GY IP 250 OP 250 PS 637: Performed by: INTERNAL MEDICINE

## 2022-03-04 PROCEDURE — 80053 COMPREHEN METABOLIC PANEL: CPT | Performed by: INTERNAL MEDICINE

## 2022-03-04 PROCEDURE — 71045 X-RAY EXAM CHEST 1 VIEW: CPT

## 2022-03-04 RX ORDER — LORAZEPAM 1 MG/1
1 TABLET ORAL ONCE
Status: COMPLETED | OUTPATIENT
Start: 2022-03-04 | End: 2022-03-04

## 2022-03-04 RX ADMIN — LORAZEPAM 1 MG: 1 TABLET ORAL at 19:11

## 2022-03-05 ASSESSMENT — ENCOUNTER SYMPTOMS
NUMBNESS: 0
BACK PAIN: 0
VOMITING: 0
CHEST TIGHTNESS: 0
NAUSEA: 0
SLEEP DISTURBANCE: 0
MYALGIAS: 0
WEAKNESS: 0
ANAL BLEEDING: 0
HEADACHES: 0
VOICE CHANGE: 0
CONFUSION: 0
WHEEZING: 0
PALPITATIONS: 0
ABDOMINAL DISTENTION: 0
CHILLS: 0
DYSURIA: 0
FLANK PAIN: 0
NERVOUS/ANXIOUS: 1
COLOR CHANGE: 0
NECK PAIN: 0
DIZZINESS: 0
SHORTNESS OF BREATH: 1
FREQUENCY: 0
BLOOD IN STOOL: 0
ABDOMINAL PAIN: 0
COUGH: 0
DIAPHORESIS: 0
FEVER: 0
LIGHT-HEADEDNESS: 0

## 2022-03-05 NOTE — ED PROVIDER NOTES
History     Chief Complaint   Patient presents with     Shortness of Breath     The history is provided by the patient.   Shortness of Breath  Severity:  Mild  Onset quality:  Gradual  Timing:  Constant  Chronicity:  Recurrent  Relieved by:  Nothing  Worsened by:  Nothing  Associated symptoms: no abdominal pain, no chest pain, no cough, no diaphoresis, no fever, no headaches, no neck pain, no rash, no vomiting and no wheezing          Allergies:  Allergies   Allergen Reactions     Seasonal Allergies        Problem List:    Patient Active Problem List    Diagnosis Date Noted     Sinus Eosinophilia 01/09/2020     Priority: Medium     Obstructive sleep apnea (adult) (pediatric) 07/12/2019     Priority: Medium     Pulmonary embolism (H) 12/20/2013     Priority: Medium     Updated per 10/1/17 IMO import       Right leg DVT (H) 12/20/2013     Priority: Medium     Sprain of thoracic region 04/14/2010     Priority: Medium     IMO Update 10/11       Sprain of lumbar region 03/15/2010     Priority: Medium     IMO Update 10/11       Pain in joint, pelvic region and thigh 04/23/2008     Priority: Medium     IMO Update 10/11       Finger injury 03/19/2008     Priority: Medium     Phlebitis and thrombophlebitis of other deep vessels of lower extremities 11/24/2004     Priority: Medium     Primary hypercoagulable state (H) 11/24/2004     Priority: Medium        Past Medical History:    Past Medical History:   Diagnosis Date     Anxiety disorder      Asthma      Factor V Leiden (H)      Migraine headache        Past Surgical History:    Past Surgical History:   Procedure Laterality Date     ENDOSCOPIC SINUS SURGERY N/A 12/10/2019    Procedure: BILATERAL ENDOSCOPIC SINUS SURGERY;  Surgeon: Marya Fraser MD;  Location: [facility]     SEPTOPLASTY, TURBINOPLASTY, COMBINED N/A 12/10/2019    Procedure: SEPTOPLASTY, TURBINATE REDUCTION;  Surgeon: Marya Fraser MD;  Location: [facility]       Family History:    Family History    Problem Relation Age of Onset     Thrombophilia Other         Blood Clots - legs     Diabetes Mother        Social History:  Marital Status:   [2]  Social History     Tobacco Use     Smoking status: Light Tobacco Smoker     Packs/day: 0.20     Years: 20.00     Pack years: 4.00     Types: Cigarettes     Smokeless tobacco: Never Used     Tobacco comment: 3-4  cigs daily  1/9/2020   Substance Use Topics     Alcohol use: Yes     Comment: occasionally     Drug use: Never        Medications:    Albuterol (VENTOLIN IN)  cetirizine (ZYRTEC) 10 MG tablet  citalopram (CELEXA) 20 MG tablet  fluticasone (FLONASE) 50 MCG/ACT nasal spray  fluticasone (FLONASE) 50 MCG/ACT nasal spray  Multiple Vitamins-Minerals (MULTIVITAMIN ADULT PO)  pantoprazole (PROTONIX) 40 MG EC tablet  rivaroxaban ANTICOAGULANT (XARELTO) 20 MG TABS tablet  budesonide (PULMICORT) 0.5 MG/2ML neb solution  budesonide (PULMICORT) 0.5 MG/2ML neb solution          Review of Systems   Constitutional: Negative for chills, diaphoresis and fever.   HENT: Negative for voice change.    Eyes: Negative for visual disturbance.   Respiratory: Positive for shortness of breath. Negative for cough, chest tightness and wheezing.    Cardiovascular: Negative for chest pain, palpitations and leg swelling.   Gastrointestinal: Negative for abdominal distention, abdominal pain, anal bleeding, blood in stool, nausea and vomiting.   Genitourinary: Negative for decreased urine volume, dysuria, flank pain and frequency.   Musculoskeletal: Negative for back pain, gait problem, myalgias and neck pain.   Skin: Negative for color change, pallor and rash.   Neurological: Negative for dizziness, syncope, weakness, light-headedness, numbness and headaches.   Psychiatric/Behavioral: Negative for confusion, sleep disturbance and suicidal ideas. The patient is nervous/anxious.        Physical Exam   BP: 145/96  Pulse: 86  Temp: 99.2  F (37.3  C)  Resp: 20  Height: 180.3 cm (5'  "11\")  Weight: 103.4 kg (228 lb)  SpO2: 98 %      Physical Exam  Vitals and nursing note reviewed.   Constitutional:       Appearance: He is well-developed.   HENT:      Head: Normocephalic and atraumatic.   Eyes:      Conjunctiva/sclera: Conjunctivae normal.      Pupils: Pupils are equal, round, and reactive to light.   Neck:      Thyroid: No thyromegaly.      Vascular: No JVD.      Trachea: No tracheal deviation.   Cardiovascular:      Rate and Rhythm: Normal rate and regular rhythm.      Heart sounds: Normal heart sounds. No murmur heard.    No gallop.   Pulmonary:      Effort: Pulmonary effort is normal. No respiratory distress.      Breath sounds: Normal breath sounds. No stridor. No wheezing or rales.   Chest:      Chest wall: No tenderness.   Abdominal:      General: Bowel sounds are normal. There is no distension.      Palpations: Abdomen is soft. There is no mass.      Tenderness: There is no abdominal tenderness. There is no guarding or rebound.   Musculoskeletal:         General: No tenderness. Normal range of motion.      Cervical back: Normal range of motion and neck supple.   Lymphadenopathy:      Cervical: No cervical adenopathy.   Skin:     General: Skin is warm.      Coloration: Skin is not pale.      Findings: No erythema or rash.   Neurological:      Mental Status: He is alert and oriented to person, place, and time.   Psychiatric:         Behavior: Behavior normal.         ED Course                 Procedures                Results for orders placed or performed during the hospital encounter of 03/04/22 (from the past 24 hour(s))   Comprehensive metabolic panel   Result Value Ref Range    Sodium 137 133 - 144 mmol/L    Potassium 3.7 3.4 - 5.3 mmol/L    Chloride 105 94 - 109 mmol/L    Carbon Dioxide (CO2) 26 20 - 32 mmol/L    Anion Gap 6 3 - 14 mmol/L    Urea Nitrogen 18 7 - 30 mg/dL    Creatinine 0.78 0.66 - 1.25 mg/dL    Calcium 8.9 8.5 - 10.1 mg/dL    Glucose 115 (H) 70 - 99 mg/dL    Alkaline " Phosphatase 67 40 - 150 U/L    AST 16 0 - 45 U/L    ALT 26 0 - 70 U/L    Protein Total 7.4 6.8 - 8.8 g/dL    Albumin 3.8 3.4 - 5.0 g/dL    Bilirubin Total 0.2 0.2 - 1.3 mg/dL    GFR Estimate >90 >60 mL/min/1.73m2   CBC with Platelets & Differential    Narrative    The following orders were created for panel order CBC with Platelets & Differential.  Procedure                               Abnormality         Status                     ---------                               -----------         ------                     CBC with platelets and d...[[id number]]  Abnormal            Final result                 Please view results for these tests on the individual orders.   Troponin I   Result Value Ref Range    Troponin I High Sensitivity 9 <79 ng/L   CBC with platelets and differential   Result Value Ref Range    WBC Count 9.5 4.0 - 11.0 10e3/uL    RBC Count 4.41 4.40 - 5.90 10e6/uL    Hemoglobin 13.2 (L) 13.3 - 17.7 g/dL    Hematocrit 39.5 (L) 40.0 - 53.0 %    MCV 90 78 - 100 fL    MCH 29.9 26.5 - 33.0 pg    MCHC 33.4 31.5 - 36.5 g/dL    RDW 14.0 10.0 - 15.0 %    Platelet Count 255 150 - 450 10e3/uL    % Neutrophils 56 %    % Lymphocytes 32 %    % Monocytes 8 %    % Eosinophils 4 %    % Basophils 0 %    % Immature Granulocytes 0 %    NRBCs per 100 WBC 0 <1 /100    Absolute Neutrophils 5.2 1.6 - 8.3 10e3/uL    Absolute Lymphocytes 3.1 0.8 - 5.3 10e3/uL    Absolute Monocytes 0.8 0.0 - 1.3 10e3/uL    Absolute Eosinophils 0.4 0.0 - 0.7 10e3/uL    Absolute Basophils 0.0 0.0 - 0.2 10e3/uL    Absolute Immature Granulocytes 0.0 <=0.4 10e3/uL    Absolute NRBCs 0.0 10e3/uL   XR Chest Port 1 View    Narrative    PROCEDURE:  XR CHEST PORT 1 VIEW    HISTORY:  sob.     COMPARISON:  None.    FINDINGS:   The cardiac silhouette is normal in size. The pulmonary vasculature is  normal.  The lungs are clear. No pleural effusion or pneumothorax.      Impression    IMPRESSION:  No acute cardiopulmonary disease.      ROGELIO SUSLAVICH, MD          SYSTEM ID:  [id number]       Medications   LORazepam (ATIVAN) tablet 1 mg (1 mg Oral Given 3/4/22 1911)       Assessments & Plan (with Medical Decision Making)   Sob , anxiety  Pt self aware that anxiety causing his sob but he has hx of DVT wanted to be checked, he already taking blood thinner for DVT  EKG: NSR,   Labs reviewed  CXR ; no acute finding  After receiving ativan , all symptoms resolved  Pt walked in ER , spo2 stayed over 97 , no tachycardia  D C home  I have reviewed the nursing notes.    I have reviewed the findings, diagnosis, plan and need for follow up with the patient.      Discharge Medication List as of 3/4/2022  8:33 PM          Final diagnoses:   Anxiety       3/4/2022   [facility]     Etienne Parikh MD  03/05/22 [id number]

## 2022-03-05 NOTE — ED TRIAGE NOTES
Pt presents ambulatory to triage with c/o increased shortness of breath and intermittent pains on chest and is worried he has a PE. HX of PE and recently DX with DVT in LT calf 3/2/22. Is on Xarelto.   Anxious, but accepts reassurance.

## 2022-07-07 DIAGNOSIS — J45.909 ASTHMA: Primary | ICD-10-CM

## 2022-08-11 ENCOUNTER — HOSPITAL ENCOUNTER (OUTPATIENT)
Dept: RESPIRATORY THERAPY | Facility: OTHER | Age: 52
Discharge: HOME | End: 2022-08-11
Admitting: INTERNAL MEDICINE
Payer: OTHER GOVERNMENT

## 2022-08-11 DIAGNOSIS — J45.909 ASTHMA: ICD-10-CM

## 2022-08-11 PROCEDURE — 999N000157 HC STATISTIC RCP TIME EA 10 MIN

## 2022-08-11 PROCEDURE — 94010 BREATHING CAPACITY TEST: CPT | Mod: 26 | Performed by: INTERNAL MEDICINE

## 2022-08-11 PROCEDURE — 94010 BREATHING CAPACITY TEST: CPT

## 2024-09-24 NOTE — TELEPHONE ENCOUNTER
Went over instructions with patient for allergy skin testing.  Reviewed patients current medications and patient will avoid all contraindicated medications prior to MQT testing.  Advised we will need approval from patient's PCP to have him hold his BB for 3 full days before testing.  Patient verbalizes understanding.  Copy of allergy testing packet will be mailed to patient.  Call transferred to [facility] [place] and follow-up.  Patient also needs an audiogram.  Will call patient to notify if his PCP has approved holding his BB.  Patient states it is ok to leave a voicemail as he is not always near his phone.    Rosita Bhandari RN     Negative

## 2025-08-07 ENCOUNTER — HOSPITAL ENCOUNTER (EMERGENCY)
Facility: HOSPITAL | Age: 55
Discharge: HOME | End: 2025-08-07
Attending: PHYSICIAN ASSISTANT | Admitting: PHYSICIAN ASSISTANT

## 2025-08-07 ENCOUNTER — APPOINTMENT (OUTPATIENT)
Dept: ULTRASOUND IMAGING | Facility: HOSPITAL | Age: 55
End: 2025-08-07
Attending: PHYSICIAN ASSISTANT

## 2025-08-07 VITALS
TEMPERATURE: 98.5 F | BODY MASS INDEX: 31.48 KG/M2 | OXYGEN SATURATION: 95 % | HEART RATE: 73 BPM | HEIGHT: 73 IN | WEIGHT: 237.5 LBS | SYSTOLIC BLOOD PRESSURE: 182 MMHG | DIASTOLIC BLOOD PRESSURE: 85 MMHG | RESPIRATION RATE: 18 BRPM

## 2025-08-07 DIAGNOSIS — I82.412 DEEP VEIN THROMBOSIS (DVT) OF FEMORAL VEIN OF LEFT LOWER EXTREMITY, UNSPECIFIED CHRONICITY (H): Primary | ICD-10-CM

## 2025-08-07 PROCEDURE — 93971 EXTREMITY STUDY: CPT | Mod: LT

## 2025-08-07 PROCEDURE — 96372 THER/PROPH/DIAG INJ SC/IM: CPT | Performed by: PHYSICIAN ASSISTANT

## 2025-08-07 PROCEDURE — G0463 HOSPITAL OUTPT CLINIC VISIT: HCPCS | Mod: 25 | Performed by: PHYSICIAN ASSISTANT

## 2025-08-07 PROCEDURE — 250N000011 HC RX IP 250 OP 636: Performed by: PHYSICIAN ASSISTANT

## 2025-08-07 PROCEDURE — 93971 EXTREMITY STUDY: CPT | Mod: 26 | Performed by: RADIOLOGY

## 2025-08-07 PROCEDURE — 99214 OFFICE O/P EST MOD 30 MIN: CPT | Performed by: PHYSICIAN ASSISTANT

## 2025-08-07 RX ORDER — ENOXAPARIN SODIUM 150 MG/ML
1 INJECTION SUBCUTANEOUS EVERY 12 HOURS
Qty: 21 ML | Refills: 0 | Status: SHIPPED | OUTPATIENT
Start: 2025-08-07 | End: 2025-08-26

## 2025-08-07 RX ORDER — ENOXAPARIN SODIUM 150 MG/ML
1 INJECTION SUBCUTANEOUS ONCE
Status: COMPLETED | OUTPATIENT
Start: 2025-08-07 | End: 2025-08-07

## 2025-08-07 RX ORDER — ENOXAPARIN SODIUM 150 MG/ML
1 INJECTION SUBCUTANEOUS EVERY 12 HOURS
Qty: 21 ML | Refills: 0 | Status: SHIPPED | OUTPATIENT
Start: 2025-08-07 | End: 2025-08-07

## 2025-08-07 RX ADMIN — ENOXAPARIN SODIUM 105 MG: 120 INJECTION SUBCUTANEOUS at 17:54

## 2025-08-07 ASSESSMENT — ACTIVITIES OF DAILY LIVING (ADL)
ADLS_ACUITY_SCORE: 41

## 2025-08-07 ASSESSMENT — ENCOUNTER SYMPTOMS: BRUISES/BLEEDS EASILY: 1

## 2025-08-07 ASSESSMENT — COLUMBIA-SUICIDE SEVERITY RATING SCALE - C-SSRS
1. IN THE PAST MONTH, HAVE YOU WISHED YOU WERE DEAD OR WISHED YOU COULD GO TO SLEEP AND NOT WAKE UP?: NO
2. HAVE YOU ACTUALLY HAD ANY THOUGHTS OF KILLING YOURSELF IN THE PAST MONTH?: NO
6. HAVE YOU EVER DONE ANYTHING, STARTED TO DO ANYTHING, OR PREPARED TO DO ANYTHING TO END YOUR LIFE?: NO

## 2025-08-11 ENCOUNTER — TELEPHONE (OUTPATIENT)
Dept: HEMATOLOGY | Facility: CLINIC | Age: 55
End: 2025-08-11
Payer: COMMERCIAL

## 2025-08-12 ENCOUNTER — MYC MEDICAL ADVICE (OUTPATIENT)
Dept: HEMATOLOGY | Facility: CLINIC | Age: 55
End: 2025-08-12
Payer: COMMERCIAL

## 2025-08-12 ENCOUNTER — VIRTUAL VISIT (OUTPATIENT)
Dept: HEMATOLOGY | Facility: CLINIC | Age: 55
End: 2025-08-12
Attending: PHYSICIAN ASSISTANT
Payer: COMMERCIAL

## 2025-08-12 VITALS — HEIGHT: 73 IN | BODY MASS INDEX: 31.47 KG/M2 | WEIGHT: 237.44 LBS

## 2025-08-12 DIAGNOSIS — I82.412 DEEP VEIN THROMBOSIS (DVT) OF FEMORAL VEIN OF LEFT LOWER EXTREMITY, UNSPECIFIED CHRONICITY (H): ICD-10-CM

## 2025-08-12 DIAGNOSIS — Z71.89 ENCOUNTER FOR ANTICOAGULATION DISCUSSION AND COUNSELING: ICD-10-CM

## 2025-08-12 DIAGNOSIS — I87.009 POST-THROMBOTIC SYNDROME: ICD-10-CM

## 2025-08-12 DIAGNOSIS — Z82.49 FAMILY HISTORY OF DVT: ICD-10-CM

## 2025-08-12 DIAGNOSIS — I87.2 VENOUS (PERIPHERAL) INSUFFICIENCY: ICD-10-CM

## 2025-08-12 DIAGNOSIS — Z86.72 PERSONAL HISTORY OF THROMBOPHLEBITIS: ICD-10-CM

## 2025-08-12 DIAGNOSIS — I82.403: Primary | ICD-10-CM

## 2025-08-12 RX ORDER — DABIGATRAN ETEXILATE 150 MG/1
150 CAPSULE ORAL 2 TIMES DAILY
Qty: 180 CAPSULE | Refills: 3 | Status: SHIPPED | OUTPATIENT
Start: 2025-08-12

## 2025-08-24 ENCOUNTER — HEALTH MAINTENANCE LETTER (OUTPATIENT)
Age: 55
End: 2025-08-24

## 2025-08-26 ENCOUNTER — TELEPHONE (OUTPATIENT)
Dept: HEMATOLOGY | Facility: CLINIC | Age: 55
End: 2025-08-26

## 2025-08-26 ENCOUNTER — VIRTUAL VISIT (OUTPATIENT)
Dept: HEMATOLOGY | Facility: CLINIC | Age: 55
End: 2025-08-26
Attending: PHYSICIAN ASSISTANT
Payer: COMMERCIAL

## 2025-08-26 VITALS — BODY MASS INDEX: 30.88 KG/M2 | WEIGHT: 234 LBS

## 2025-08-26 DIAGNOSIS — M79.605 PAIN OF LEFT LOWER EXTREMITY: ICD-10-CM

## 2025-08-26 DIAGNOSIS — I82.403: Primary | ICD-10-CM

## 2025-08-26 PROCEDURE — 98014 SYNCH AUDIO-ONLY EST MOD 30: CPT | Performed by: PHYSICIAN ASSISTANT

## 2025-08-26 RX ORDER — GABAPENTIN 100 MG/1
100 CAPSULE ORAL 3 TIMES DAILY
Qty: 42 CAPSULE | Refills: 0 | Status: SHIPPED | OUTPATIENT
Start: 2025-08-26

## 2025-08-27 ENCOUNTER — HOSPITAL ENCOUNTER (OUTPATIENT)
Dept: ULTRASOUND IMAGING | Facility: HOSPITAL | Age: 55
Discharge: HOME | End: 2025-08-27
Attending: PHYSICIAN ASSISTANT
Payer: COMMERCIAL

## 2025-08-27 ENCOUNTER — LAB (OUTPATIENT)
Dept: LAB | Facility: OTHER | Age: 55
End: 2025-08-27
Attending: PHYSICIAN ASSISTANT
Payer: COMMERCIAL

## 2025-08-27 DIAGNOSIS — I82.403: ICD-10-CM

## 2025-08-27 PROCEDURE — 93971 EXTREMITY STUDY: CPT | Mod: LT

## 2025-08-27 PROCEDURE — 93971 EXTREMITY STUDY: CPT | Mod: 26 | Performed by: RADIOLOGY

## (undated) DEVICE — SUTURE-CHROMIC GUT 4-0 RB-1 U203H

## (undated) DEVICE — LABEL-STERILE PREPRINTED FOR OR

## (undated) DEVICE — LIGHT HANDLE COVER

## (undated) DEVICE — CAUTERY PENCIL-W/SUCTION 8FR HANDSWITCHING

## (undated) DEVICE — GLV-6.5 PROTEXIS PI CLASSIC LF/PF

## (undated) DEVICE — SUTURE-SILK 0 SH K834H

## (undated) DEVICE — COBLATION WAND-TURBINATE REDUCT. PTR

## (undated) DEVICE — GOWN-SURG XL LVL 3 REINFORCED

## (undated) DEVICE — BLADE-SCALPEL #15

## (undated) DEVICE — CAUTERY PAD-POLYHESIVE II ADULT

## (undated) DEVICE — SOL-NACL 0.9% 1000ML

## (undated) DEVICE — TRACKER-PATIENT ENT NIPT

## (undated) DEVICE — CANISTER-SUCTION 2000CC

## (undated) DEVICE — ADAPTER-SPECIMEN TRAP

## (undated) DEVICE — IRRIGATION-H2O 1000ML

## (undated) DEVICE — NASOPORE 4CM FIRM

## (undated) DEVICE — BLADE-FUSION ROTATABLE QUADCUT 4.3MM X 13CM

## (undated) DEVICE — NDL-REINFORCED ANESTHESIA 27G X 3.5"

## (undated) DEVICE — BIN-ENT BIN

## (undated) DEVICE — IRRIGATION-NACL 1000ML

## (undated) DEVICE — TRACKER-INSTRUMENT ENT NAV

## (undated) DEVICE — TUBING-IRRIG. SYSTEM CLEARVISION

## (undated) DEVICE — INFLATION DEVICE-SINUS BALLOON CATH

## (undated) DEVICE — RELIEVA SPINPLUS BALLOON SYSTEM

## (undated) DEVICE — BLANKET-BAIR LOWER EXTREMITY

## (undated) DEVICE — TUBING-IRRIGATOR STRAIGHTSHOT FUSION

## (undated) DEVICE — SCD SLEEVE-KNEE REG.

## (undated) DEVICE — SUTURE-VICRYL 4-0 P-3 J494G

## (undated) DEVICE — PACK-ENT-CUSTOM

## (undated) DEVICE — INSTRUMENT WIPE-VISIWIPE

## (undated) RX ORDER — LIDOCAINE HYDROCHLORIDE 20 MG/ML
INJECTION, SOLUTION EPIDURAL; INFILTRATION; INTRACAUDAL; PERINEURAL
Status: DISPENSED
Start: 2019-12-10

## (undated) RX ORDER — PROPOFOL 10 MG/ML
INJECTION, EMULSION INTRAVENOUS
Status: DISPENSED
Start: 2019-12-10

## (undated) RX ORDER — NEOSTIGMINE METHYLSULFATE 1 MG/ML
VIAL (ML) INJECTION
Status: DISPENSED
Start: 2019-12-10

## (undated) RX ORDER — DEXAMETHASONE SODIUM PHOSPHATE 10 MG/ML
INJECTION, SOLUTION INTRAMUSCULAR; INTRAVENOUS
Status: DISPENSED
Start: 2019-12-10

## (undated) RX ORDER — FENTANYL CITRATE 50 UG/ML
INJECTION, SOLUTION INTRAMUSCULAR; INTRAVENOUS
Status: DISPENSED
Start: 2019-12-10

## (undated) RX ORDER — GLYCOPYRROLATE 0.2 MG/ML
INJECTION, SOLUTION INTRAMUSCULAR; INTRAVENOUS
Status: DISPENSED
Start: 2019-12-10

## (undated) RX ORDER — ONDANSETRON 2 MG/ML
INJECTION INTRAMUSCULAR; INTRAVENOUS
Status: DISPENSED
Start: 2019-12-10